# Patient Record
Sex: FEMALE | Race: WHITE | NOT HISPANIC OR LATINO | Employment: FULL TIME | URBAN - METROPOLITAN AREA
[De-identification: names, ages, dates, MRNs, and addresses within clinical notes are randomized per-mention and may not be internally consistent; named-entity substitution may affect disease eponyms.]

---

## 2017-08-22 ENCOUNTER — OFFICE VISIT (OUTPATIENT)
Dept: NEUROSURGERY | Facility: CLINIC | Age: 42
End: 2017-08-22

## 2017-08-22 VITALS — TEMPERATURE: 95.3 F | HEIGHT: 63 IN | WEIGHT: 163 LBS | BODY MASS INDEX: 28.88 KG/M2

## 2017-08-22 DIAGNOSIS — M50.30 BULGING OF CERVICAL INTERVERTEBRAL DISC: Primary | ICD-10-CM

## 2017-08-22 DIAGNOSIS — M47.812 CERVICAL ARTHRITIS: ICD-10-CM

## 2017-08-22 DIAGNOSIS — M50.30 DEGENERATIVE DISC DISEASE, CERVICAL: ICD-10-CM

## 2017-08-22 PROCEDURE — 99243 OFF/OP CNSLTJ NEW/EST LOW 30: CPT | Performed by: NEUROLOGICAL SURGERY

## 2017-08-22 RX ORDER — SERTRALINE HYDROCHLORIDE 25 MG/1
25 TABLET, FILM COATED ORAL DAILY
COMMUNITY
End: 2017-11-13 | Stop reason: DRUGHIGH

## 2017-08-22 NOTE — PROGRESS NOTES
Patient: Rebeca Fitzpatrick  : 1975    Primary Care Provider: URIEL Graves    Requesting Provider: As above        History    Chief Complaint: Neck pain.    History of Present Illness: Ms. Fitzpatrick is a 41 year old  with a two-year history of bothersome neck pain.  She denies any inciting or precipitating events.  She has seen two different chiropractors and has undergone physical therapy.  Traction has been somewhat helpful.  On occasion she gets pain extending into the proximal upper arms.  She does not really describe extensive radicular spread of her pain.  She has no numbness, tingling, or weakness.  Aleve has been helpful.  She reports no bowel or bladder difficulties.  She has no gait dysfunction.    Review of Systems   Constitutional: Positive for activity change and fatigue. Negative for appetite change, chills, diaphoresis, fever and unexpected weight change.   HENT: Positive for postnasal drip. Negative for congestion, dental problem, drooling, ear discharge, ear pain, facial swelling, hearing loss, mouth sores, nosebleeds, rhinorrhea, sinus pressure, sneezing, sore throat, tinnitus, trouble swallowing and voice change.    Eyes: Negative for photophobia, pain, discharge, redness, itching and visual disturbance.   Respiratory: Negative for apnea, cough, choking, chest tightness, shortness of breath, wheezing and stridor.    Cardiovascular: Negative for chest pain, palpitations and leg swelling.   Gastrointestinal: Negative for abdominal distention, abdominal pain, anal bleeding, blood in stool, constipation, diarrhea, nausea, rectal pain and vomiting.   Endocrine: Negative for cold intolerance, heat intolerance, polydipsia, polyphagia and polyuria.   Genitourinary: Negative for decreased urine volume, difficulty urinating, dysuria, enuresis, flank pain, frequency, genital sores, hematuria and urgency.   Musculoskeletal: Positive for back pain, myalgias, neck pain and neck stiffness.  "Negative for arthralgias, gait problem and joint swelling.   Skin: Negative for color change, pallor, rash and wound.   Allergic/Immunologic: Positive for environmental allergies. Negative for food allergies and immunocompromised state.   Neurological: Positive for light-headedness and headaches. Negative for dizziness, tremors, seizures, syncope, facial asymmetry, speech difficulty, weakness and numbness.   Hematological: Negative for adenopathy. Does not bruise/bleed easily.   Psychiatric/Behavioral: Positive for decreased concentration. Negative for agitation, behavioral problems, confusion, dysphoric mood, hallucinations, self-injury, sleep disturbance and suicidal ideas. The patient is not nervous/anxious and is not hyperactive.    All other systems reviewed and are negative.      The patient's past medical history, past surgical history, family history, and social history have been reviewed at length in the electronic medical record.    Physical Exam:   Temp 95.3 °F (35.2 °C) (Temporal Artery )   Ht 63\" (160 cm)  Wt 163 lb (73.9 kg)  BMI 28.87 kg/m2  CONSTITUTIONAL: Patient is well-nourished, pleasant and appears stated age.  CV: Heart regular rate and rhythm without murmur, rub, or gallop.  PULMONARY: Lungs are clear to ascultation.  MUSCULOSKELETAL:  Neck tenderness to palpation is not observed.   ROM in neck is limited in all directions particularly rotation leftward.  NEUROLOGICAL:  Orientation, memory, attention span, language function, and cognition have been examined and are intact.  Strength is intact in the upper and lower extremities to direct testing.  Muscle tone is normal throughout.  Station and gait are normal.  Sensation is intact to light touch testing throughout.  Deep tendon reflexes are 3+ and symmetrical.  Cindi's Sign is positive bilaterally. No clonus is elicited.  Coordination is intact.      Medical Decision Making    Data Review:   MRI of the cervical spine dated 8/14/17 " demonstrates some disc-osteophyte rightward at C5-6.  Cord compromise or signal change within the cord is not observed.    Diagnosis:   The patient despite having some disc protrusion and subtle long tract signs on examination does not harbor a focal radiculopathy or a myelopathy.  I believe that she is symptomatic from mechanical neck pain.    Treatment Options:   I do not currently see a role for surgical intervention.  I'm going to refer her to the pain clinic for some facet blocks and if necessary rhizotomies.  She will follow-up with me on an as-needed basis.       Diagnosis Plan   1. Bulging of cervical intervertebral disc  Ambulatory Referral to Pain Management   2. Cervical radiculopathy     3. Degenerative disc disease, cervical     4. Cervical arthritis         Scribed for Shorty Rodriguez MD by Angelina White CMA on 08/22/2017 at 1:49 PM    I, Dr. Rodrigeuz, personally performed the services described in the documentation, as scribed in my presence, and it is both accurate and complete.

## 2017-08-25 ENCOUNTER — TELEPHONE (OUTPATIENT)
Dept: PAIN MEDICINE | Facility: CLINIC | Age: 42
End: 2017-08-25

## 2017-09-07 ENCOUNTER — OFFICE VISIT (OUTPATIENT)
Dept: PAIN MEDICINE | Facility: CLINIC | Age: 42
End: 2017-09-07

## 2017-09-07 VITALS
TEMPERATURE: 97.6 F | OXYGEN SATURATION: 98 % | HEART RATE: 64 BPM | WEIGHT: 160 LBS | HEIGHT: 63 IN | RESPIRATION RATE: 14 BRPM | BODY MASS INDEX: 28.35 KG/M2 | SYSTOLIC BLOOD PRESSURE: 113 MMHG | DIASTOLIC BLOOD PRESSURE: 85 MMHG

## 2017-09-07 DIAGNOSIS — M50.920 CERVICAL DISC DISORDER OF MID-CERVICAL REGION: ICD-10-CM

## 2017-09-07 DIAGNOSIS — M79.18 MYOFASCIAL PAIN: ICD-10-CM

## 2017-09-07 DIAGNOSIS — F32.A ANXIETY AND DEPRESSION: ICD-10-CM

## 2017-09-07 DIAGNOSIS — R51.9 OCCIPITAL HEADACHE: ICD-10-CM

## 2017-09-07 DIAGNOSIS — M47.812 CERVICAL SPONDYLOSIS WITHOUT MYELOPATHY: ICD-10-CM

## 2017-09-07 DIAGNOSIS — F41.9 ANXIETY AND DEPRESSION: ICD-10-CM

## 2017-09-07 PROCEDURE — 99203 OFFICE O/P NEW LOW 30 MIN: CPT | Performed by: ANESTHESIOLOGY

## 2017-09-07 RX ORDER — ACETAMINOPHEN 500 MG
TABLET ORAL
Qty: 120 TABLET | Refills: 3 | Status: SHIPPED | OUTPATIENT
Start: 2017-09-07 | End: 2022-05-10

## 2017-09-07 RX ORDER — TIZANIDINE 2 MG/1
TABLET ORAL
Qty: 90 TABLET | Refills: 3 | Status: SHIPPED | OUTPATIENT
Start: 2017-09-07 | End: 2022-05-10

## 2017-09-07 RX ORDER — MELOXICAM 7.5 MG/1
TABLET ORAL
Qty: 60 TABLET | Refills: 3 | Status: SHIPPED | OUTPATIENT
Start: 2017-09-07 | End: 2022-05-10

## 2017-09-07 NOTE — PROGRESS NOTES
"Chief Complaint:  \"Pain in my neck.\"    History of Present Illness:   Patient: Ms. Rebeca Fitzpatrick, 41 y.o. female   Referring physician: Dr. Shorty Rodriguez   Reason for referral: Consultation for intractable chronic neck pain.   Pain history: Patient reports a 2 year history of pain, which began without incident. Pain has progressed in intensity over the past 1 year.   Pain description: Constant pain with intermittent exacerbation, described as aching, sharp and throbbing sensation.   Radiation of pain: The neck pain occasionally radiates into the upper back, shoulders, and occipital region  Pain intensity today: 8/10  Average pain intensity last week: 6-7/10  Pain intensity ranges from: 4/10 to 9/10  Aggravating factors: Pain increases with flexion, extension, and rotation of the cervical spine, and sitting at her desk for 5-10 minutes.   Alleviating factors: Pain decreases with physical therapy, specifically traction.   Associated symptoms:   Patient denies pain, numbness or weakness in the upper extremities.   Patient denies any new bladder or bowel problems.   Patient denies difficulties with her balance or recent falls.  Patient reports occipital headaches 2-3 x week lasting all day.    Review of previous therapies and additional medical records:  Rebeca Fitzpatrick has already failed the following measures, including:   Conservative measures: oral analgesics, physical therapy, ice, heat and chiropractic therapy   Interventional measures: None  Surgical measures: No previous cervical spine surgery  Rebeca Fitzpatrick underwent neurosurgical  consultation with Dr. Shorty Rodriguez on 08/22/2017, and was found not to be a surgical candidate.  Rebeca Fitzpatrick presents with significant comorbidities including anxiety and depression, engaged in treatment.   In terms of current analgesics, Rebeca Fitzpatrick takes: Johanny  I have reviewed [facility] Report #[id number] consistent to medication reconciliation.    Review of Diagnostic Studies:    MRI " Cervical Spine w/o Contrast on 08/14/2017: The visualized posterior fossa and skull base image normally.  There is mild.  Nasal or sinus disease partially visualized.  No discrete cord signal lesions are evident.  C2-C3: No significant canal or foraminal stenosis.  C3-C4: No significant canal or foraminal stenosis.  C4-C5: No significant canal or foraminal stenosis.    C5-C6: No significant canal or foraminal stenosis.  C6-C7: Small right paracentral disc osteophyte complex formation which effaces the right ventral cord surface. There is moderate to severe narrowing of the right neural foramen.  There is mild narrowing of the left neural foramen.  C7-T1: No significant canal or foraminal stenosis.  MRI Cervical Spine without Contrast, 09/04/2015. Vertebral body show no significant abnormalities. There are small anterior osteophytes in the lower cervical spine that are unlikely to be clinically significant. There is also small epidural defect in the thecal sac at the C6 6-C7 disc space level, which significantly indents the thecal sac, but does not compress the cord.  No intrinsic lesions are within the cord. The paravertebral tissues are unremarkable.  C3-C4: No significant abnormalities are seen.  C4-C5: No significant abnormalities are seen.  C5-C6: No significant abnormalities are seen.  C6-C7: There is a shallow right disc osteophyte complex which slightly indents the thecal sac, but does not appear to compress the cord or the exiting right C7 nerve root.  C7-T1: No significant abnormalities are seen.      Review of Systems   Constitutional: Positive for fatigue.   HENT: Positive for sinus pressure.    Eyes: Positive for itching.   Musculoskeletal: Positive for back pain, myalgias, neck pain and neck stiffness.   Allergic/Immunologic: Positive for environmental allergies.   Neurological: Positive for light-headedness and headaches.   Psychiatric/Behavioral: The patient is nervous/anxious (depression).    All  "other systems reviewed and are negative.     Patient Active Problem List   Diagnosis   • Cervical spondylosis without myelopathy   • Cervical disc disorder of mid-cervical region   • Myofascial pain   • Occipital headache   • Anxiety and depression     History reviewed. No pertinent past medical history.      Past Surgical History:   Procedure Laterality Date   • LAPAROSCOPY WITH LASER      [facility]         Family History   Problem Relation Age of Onset   • Brain cancer Father          Social History     Social History   • Marital status: Unknown     Spouse name: N/A   • Number of children: N/A   • Years of education: N/A     Social History Main Topics   • Smoking status: Never Smoker   • Smokeless tobacco: None   • Alcohol use Yes   • Drug use: No   • Sexual activity: Defer     Other Topics Concern   • None     Social History Narrative        Current Outpatient Prescriptions:   •  sertraline (ZOLOFT) 25 MG tablet, Take 25 mg by mouth Daily., Disp: , Rfl:       No Known Allergies      /85 (BP Location: Right arm, Patient Position: Sitting, Cuff Size: Large Adult)  Pulse 64  Temp 97.6 °F (36.4 °C) (Temporal Artery )   Resp 14  Ht 63\" (160 cm)  Wt 160 lb (72.6 kg)  SpO2 98%  BMI 28.34 kg/m2      Physical Exam:  Constitutional: Patient is oriented to person, place, and time. Vital signs are normal. Patient appears well-developed and well-nourished.   HENT: Head: Normocephalic and atraumatic. Eyes: Conjunctivae and lids are normal. Pupils: Equal, round, reactive to light.   Neck: Trachea normal. Neck supple. No JVD present.   Lymphatic: No cervical adenopathy  Pulmonary Respiratory effort: No increased work of breathing or signs of respiratory distress. Auscultation of lungs: Clear to auscultation.   Cardiovascular Auscultation of heart: Normal rate and rhythm, normal S1 and S2, no murmurs.   Musculoskeletal   Gait and station: Gait evaluation demonstrated a normal gait   Cervical spine: Passive " and active range of motion is limited secondary to pain. Extension, flexion, lateral flexion, rotation of the cervical spine increased and reproduced pain. Cervical facet joint loading maneuvers are positive.  Muscles: Presence of active trigger points at the bilateral levator scapulae and rhomboids   Shoulders: The range of motion of the glenohumeral joints is full and without pain. Rotator cuff strength is 5/5.   Thoracic spine: Thoracic facet joint loading maneuvers are negative   Neurological: Patient is alert and oriented to person, place, and time. Speech: speech is normal. Cortical function: Normal mental status.   Cranial nerves: Cranial nerves 2-12 intact.   Reflex Scores: 3+, symmetrical.  Motor strength: 5/5  Motor Tone: normal tone.   Involuntary movements: none.   Superficial/Primitive Reflexes: primitive reflexes were absent.   Right Krishna: absent  Left Krishna: absent  Right ankle clonus: absent  Left ankle clonus: absent   No nuchal rigidity. Negative Kernig and Brudzinski.  Spurling sign is negative. Lhermitte sign is negative. Negative long tract signs. Straight leg raising test is negative. Femoral stretch sign is negative.   Sensation: No sensory loss. Sensory exam: intact to light touch, intact pain and temperature sensation, intact vibration sensation and normal proprioception.   Coordination: Normal finger to nose and heel to shin. Normal balance and negative. Romberg's sign negative.   Skin and subcutaneous tissue: Skin is warm and intact. No rash noted. No cyanosis.   Psychiatric: Judgment and insight: Normal. Orientation to person, place and time: Normal. Recent and remote memory: Intact. Mood and affect: Normal.     ASSESSMENT:   1. Cervical spondylosis without myelopathy    2. Cervical disc disorder of mid-cervical region    3. Myofascial pain    4. Occipital headache    5. Anxiety and depression      PLAN/MEDICAL DECISION MAKING: I had a lengthy conversation with Ms. Rebeca Fitzpatrick  regarding her chronic pain condition and potential therapeutic options including risks, benefits, alternative therapies, to name a few. Patient has failed to obtain pain relief with conservative measures, as referenced above. I have reviewed all available patient's medical records as well as previous therapies as referenced above.  Therefore, I have proposed the following plan:  1. Pharmacological measures: Reviewed. Discussed. Patient takes Aleve as needed  A. Trial with tizanidine 2 mg 1/2 to 1 tablet three times a day as needed muscle spasms  B. Take Tylenol 500 mg four times a day as needed for mild to moderate breakthrough pain  C. Trial with meloxicam  7.5 mg two times a day as needed for moderate to severe breakthrough pain. Do not take other NSAIDs  D. Trial with prilocaine 2%, lidocaine 10%, imipramine 3%, capsaicin 0.001% and mannitol 20%  cream, apply 1 to 2 grams of cream to the affected areas every 4 to 6 hours as needed  2. Interventional pain management measures: Patient will be scheduled for diagnostic and therapeutic bilateral cervical facet joint injections at C5-C6 and C6-C7 combined with trigger point injections at the bilateral levator scapulae and bilateral rhomboids. If patient continues to struggle with pain, then, we will proceed with diagnostic bilateral cervical medial branch blocks at C5, C6, C7; for bilateral cervical facet joints at C5-C6, C6-C7 to clarify the origin of chronic refractory mechanical/axial cervicalgia. If patient experiences more than 80% pain relief along with significant improvement in the range of motion of the cervical spine, then, patient will be scheduled for a second set of diagnostic bilateral cervical medial branch blocks, to then, proceed with bilateral cervical medial branch rhizotomies. Otherwise, we will proceed with cervical epidural steroid injection by interlaminar approach. We may repeat another epidural depending on patient's outcome.    3. Long-term  rehabilitation efforts:  A. The patient does not have a history of falls. I did complete a risk assessment for falls.   B. Patient will start a comprehensive physical therapy program for upper body strengthening/posture correction, myofascial release, cupping and dry needling once pain is under control  C. Start an exercise program such as yoga, Pilates and daily walks for fitness  D. Contrast therapy: Apply ice-packs for 15-20 minutes, followed by heating pads for 15-20 minutes to affected area   E. Patient has declined a referral to Dr. Ash Christensen for cognitive behavioral therapy.  4. The patient and her family have been instructed to contact my office with any questions or difficulties. The patient understands the plan and agrees to proceed accordingly.       Patient Care Team:  URIEL Jenkins as PCP - General (Physician Assistant)  Shorty Rodriguez MD as Surgeon (Neurosurgery)  Jono Parham MD as Consulting Physician (Pain Medicine)     No orders of the defined types were placed in this encounter.        No future appointments.      Jono Parham MD     EMR Dragon/Transcription disclaimer:  Much of this encounter note is an electronic transcription of spoken language to printed text. Electronic transcription of spoken language may permit erroneous, or at times, nonsensical words or phrases to be inadvertently transcribed. Although I have reviewed the note for such errors, some may still exist.

## 2017-09-25 ENCOUNTER — OUTSIDE FACILITY SERVICE (OUTPATIENT)
Dept: PAIN MEDICINE | Facility: CLINIC | Age: 42
End: 2017-09-25

## 2017-09-25 PROCEDURE — 20553 NJX 1/MLT TRIGGER POINTS 3/>: CPT | Performed by: ANESTHESIOLOGY

## 2017-09-25 PROCEDURE — 99152 MOD SED SAME PHYS/QHP 5/>YRS: CPT | Performed by: ANESTHESIOLOGY

## 2017-09-25 PROCEDURE — 64491 INJ PARAVERT F JNT C/T 2 LEV: CPT | Performed by: ANESTHESIOLOGY

## 2017-09-25 PROCEDURE — 64490 INJ PARAVERT F JNT C/T 1 LEV: CPT | Performed by: ANESTHESIOLOGY

## 2017-11-10 ENCOUNTER — TELEPHONE (OUTPATIENT)
Dept: PAIN MEDICINE | Facility: CLINIC | Age: 42
End: 2017-11-10

## 2017-11-11 NOTE — PROGRESS NOTES
"Chief Complaint:  \"Pain in my neck.\"    History of Present Illness: Ms. Rebeca Fitzpatrick, 41 y.o. Female, originally referred by Dr. Shorty Rodriguez in consultation for intractable chronic neck pain and headaches.  Pain history: Patient reports a 2 year history of pain, which began without incident. Pain has progressed in intensity over the past 1 year. Patient called on 11/10/2017, requesting an appointment for evaluation of unrelenting occipital headaches. Patient was last seen on 09/25/2017, for diagnostic and therapeutic cervical facet joint injections and TPIs. She reports that she experienced complete pain relief for one day, with progressive recurrence of her pain. She reports that the pain that she is experiencing now is \"higher\" in her neck and associated with her chronic occipital headaches. Her previous pain radiating down the shoulders has improved since her procedure. Patient participated in physical therapy, walking for excercise, has used contrast therapy, Tylenol , meloxicam, and Zanaflex, without relief. She was unable to get to the pain cream due to insurance.   Pain description: Constant pain with intermittent exacerbation, described as aching, sharp and throbbing sensation.   Radiation of pain: The neck pain occasionally radiates into the upper back, shoulders, and occipital region  Pain intensity today: 7/10  Average pain intensity last week: 6-7/10  Pain intensity ranges from: 4/10 to 8/10  Aggravating factors: Pain increases with rotation of the cervical spine, and sitting at her desk for 5-10 minutes.   Alleviating factors: Pain decreases with physical therapy, specifically traction.   Associated symptoms:   Patient denies pain, numbness or weakness in the upper extremities.   Patient denies any new bladder or bowel problems.   Patient denies difficulties with her balance or recent falls.  Patient reports occipital headaches 2-3 x week lasting all day.She reports occipital headaches going up the " back of her head to the vertex.     Review of previous therapies and additional medical records:  Rebeca Fitzpatrick has already failed the following measures, including:   Conservative measures: oral analgesics, physical therapy, ice, heat and chiropractic therapy   Interventional measures: As referenced above  Surgical measures: No previous cervical spine surgery  Rebeca Fitzpatrick underwent neurosurgical consultation with Dr. Shorty Rodriguez on 08/22/2017, and was found not to be a surgical candidate.  Rebeca Fitzpatrick presents with significant comorbidity including anxiety and depression, engaged in treatment.   In terms of current analgesics, Rebeca Fitzpatrick takes: Tylenol, meloxicam, tizanidine  I have reviewed [facility] Report #[id number] consistent to medication reconciliation.    Diagnostic Studies:    MRI Cervical Spine w/o Contrast on 08/14/2017: The visualized posterior fossa and skull base image normally.  There is mild.  Nasal or sinus disease partially visualized.  No discrete cord signal lesions are evident.  C2-C3: No significant canal or foraminal stenosis.  C3-C4: No significant canal or foraminal stenosis.  C4-C5: No significant canal or foraminal stenosis.    C5-C6: No significant canal or foraminal stenosis.  C6-C7: Small right paracentral disc osteophyte complex formation which effaces the right ventral cord surface. There is moderate to severe narrowing of the right neural foramen.  There is mild narrowing of the left neural foramen.  C7-T1: No significant canal or foraminal stenosis.  MRI Cervical Spine without Contrast, 09/04/2015. Vertebral body show no significant abnormalities. There are small anterior osteophytes in the lower cervical spine that are unlikely to be clinically significant. There is also small epidural defect in the thecal sac at the C6 6-C7 disc space level, which significantly indents the thecal sac, but does not compress the cord.  No intrinsic lesions are within the cord. The paravertebral  tissues are unremarkable.  C3-C4: No significant abnormalities are seen.  C4-C5: No significant abnormalities are seen.  C5-C6: No significant abnormalities are seen.  C6-C7: There is a shallow right disc osteophyte complex which slightly indents the thecal sac, but does not appear to compress the cord or the exiting right C7 nerve root.  C7-T1: No significant abnormalities are seen.      Review of Systems   Constitutional: Positive for fatigue.   HENT: Positive for sinus pressure.    Eyes: Positive for itching.   Musculoskeletal: Positive for back pain, myalgias, neck pain and neck stiffness.   Allergic/Immunologic: Positive for environmental allergies.   Neurological: Positive for light-headedness and headaches.   Psychiatric/Behavioral: The patient is nervous/anxious (depression).    All other systems reviewed and are negative.     Patient Active Problem List   Diagnosis   • Cervical spondylosis without myelopathy   • Cervical disc disorder of mid-cervical region   • Myofascial pain   • Anxiety and depression   • Bilateral occipital neuralgia     Past Medical History:   Diagnosis Date   • Extremity pain    • Headache    • Neck pain          Past Surgical History:   Procedure Laterality Date   • LAPAROSCOPY WITH LASER      [facility]         Family History   Problem Relation Age of Onset   • Brain cancer Father          Social History     Social History   • Marital status: Unknown     Spouse name: N/A   • Number of children: N/A   • Years of education: N/A     Social History Main Topics   • Smoking status: Never Smoker   • Smokeless tobacco: Never Used   • Alcohol use Yes   • Drug use: No   • Sexual activity: Defer     Other Topics Concern   • None     Social History Narrative        Current Outpatient Prescriptions:   •  acetaminophen (TYLENOL) 500 MG tablet, Take one tablet by mouth four times a day as needed for mild to moderate breakthrough pain, Disp: 120 tablet, Rfl: 3  •  meloxicam (MOBIC) 7.5 MG  "tablet, Take 1 tablet two times a day as needed for moderate to severe breakthrough pain. Do not take any other NSAIDs., Disp: 60 tablet, Rfl: 3  •  sertraline (ZOLOFT) 100 MG tablet, , Disp: , Rfl: 0  •  tiZANidine (ZANAFLEX) 2 MG tablet, Take half to 1 tablet three times a day as needed for muscle spasms., Disp: 90 tablet, Rfl: 3      No Known Allergies      /80 (BP Location: Right arm, Patient Position: Sitting)  Pulse 97  Temp 97.8 °F (36.6 °C) (Temporal Artery )   Resp 18  Ht 63\" (160 cm)  Wt 164 lb (74.4 kg)  SpO2 97%  BMI 29.05 kg/m2      Physical Exam:  Constitutional: Patient is oriented to person, place, and time. Vital signs are normal. Patient appears well-developed and well-nourished.   HENT: Head: Normocephalic and atraumatic. Eyes: Conjunctivae and lids are normal. Pupils: Equal, round, reactive to light.   Neck: Trachea normal. Neck supple. No JVD present.   Lymphatic: No cervical adenopathy  Pulmonary Respiratory effort: No increased work of breathing or signs of respiratory distress. Auscultation of lungs: Clear to auscultation.   Cardiovascular Auscultation of heart: Normal rate and rhythm, normal S1 and S2, no murmurs.   Musculoskeletal   Gait and station: Gait evaluation demonstrated a normal gait   Cervical spine: Passive and active range of motion is significantly improved. Only rotation of the cervical spine increased and reproduced pain. Cervical facet joint loading maneuvers are positive on the left side only at C2-C3, C3-C4.  Muscles: Presence of active trigger points at the bilateral levator scapulae   Shoulders: The range of motion of the glenohumeral joints is full and without pain. Rotator cuff strength is 5/5.   Thoracic spine: Thoracic facet joint loading maneuvers are negative   Neurological: Patient is alert and oriented to person, place, and time. Speech: speech is normal. Cortical function: Normal mental status.   Cranial nerves: Cranial nerves 2-12 intact.   Reflex " Scores: 3+, symmetrical.  Motor strength: 5/5  Motor Tone: normal tone.   Involuntary movements: none.   Superficial/Primitive Reflexes: primitive reflexes were absent.   Right Krishna: absent  Left Krishna: absent  Right ankle clonus: absent  Left ankle clonus: absent   Spurling sign is negative. Lhermitte sign is negative. Negative long tract signs. Straight leg raising test is negative. Femoral stretch sign is negative.   Sensation: No sensory loss. Sensory exam: intact to light touch, intact pain and temperature sensation, intact vibration sensation and normal proprioception.   Coordination: Normal finger to nose and heel to shin. Normal balance and negative. Romberg's sign negative.   Skin and subcutaneous tissue: Skin is warm and intact. No rash noted. No cyanosis.   Psychiatric: Judgment and insight: Normal. Orientation to person, place and time: Normal. Recent and remote memory: Intact. Mood and affect: Normal.     ASSESSMENT:   1. Bilateral occipital neuralgia    2. Cervical spondylosis without myelopathy    3. Cervical disc disorder of mid-cervical region    4. Myofascial pain    5. Anxiety and depression       PLAN/MEDICAL DECISION MAKING:   I had a lengthy conversation with Ms. Rebeca Fitzpatrick regarding her chronic pain condition and potential therapeutic options including risks, benefits, alternative therapies, to name a few. Patient has failed to obtain pain relief with conservative measures, as referenced above. I have reviewed all available patient's medical records as well as previous therapies as referenced above.  Therefore, I have proposed the following plan:  1. Pharmacological measures: Reviewed. Discussed.   A.  Continue tizanidine 2 mg 1/2 to 1 tablet three times a day as needed muscle spasms  B. Take Tylenol 500 mg four times a day as needed for mild to moderate breakthrough pain  C. Continue meloxicam  7.5 mg two times a day as needed for moderate to severe breakthrough pain. Do not take other  NSAIDs  D. Trial with gabapentin 100 mg four times a day  E. Trial with lamotrigine 2.5%, lidocaine 2.22%, prilocaine 2.22%, meloxicam 0.09% cream, apply 1 to 2 grams of cream to the affected areas every 4 to 6 hours as needed  2. Interventional pain management measures: Patient will be scheduled for diagnostic and therapeutic bilateral greater occipital nerve blocks by hydrodissection technique under ultrasound and fluoroscopic guidance. If patient continues to struggle with pain, then, we may proceed with diagnostic bilateral cervical medial branch blocks at C3-C4, C5; for bilateral cervical facet joints at C3-C4, C4-C5 to clarify the origin of chronic refractory mechanical/axial cervicalgia. If patient experiences more than 80% pain relief along with significant improvement in the range of motion of the cervical spine, then, patient will be scheduled for a second set of diagnostic bilateral cervical medial branch blocks, to then, proceed with bilateral cervical medial branch rhizotomies.   3. Long-term rehabilitation efforts:  A. The patient does not have a history of falls. I did complete a risk assessment for falls.   B. Patient will resume a comprehensive physical therapy program for upper body strengthening/posture correction, myofascial release, cupping and dry needling once her pain is under control  C. Start an exercise program such as yoga, Pilates and daily walks for fitness  D. Contrast therapy: Apply ice-packs for 15-20 minutes, followed by heating pads for 15-20 minutes to affected area   E. Referral to Dr. Ash Christensen for cognitive behavioral therapy and biofeedback.  4. The patient and her family have been instructed to contact my office with any questions or difficulties. The patient understands the plan and agrees to proceed accordingly.       Patient Care Team:  URIEL Jenkins as PCP - General (Physician Assistant)  Shorty Rodriguez MD as Surgeon (Neurosurgery)  Jono Parham MD as  Consulting Physician (Pain Medicine)     New Medications Ordered This Visit   Medications   • sertraline (ZOLOFT) 100 MG tablet     Refill:  0         No future appointments.      Jono Parham MD     EMR Dragon/Transcription disclaimer:  Much of this encounter note is an electronic transcription of spoken language to printed text. Electronic transcription of spoken language may permit erroneous, or at times, nonsensical words or phrases to be inadvertently transcribed. Although I have reviewed the note for such errors, some may still exist.

## 2017-11-13 ENCOUNTER — OFFICE VISIT (OUTPATIENT)
Dept: PAIN MEDICINE | Facility: CLINIC | Age: 42
End: 2017-11-13

## 2017-11-13 VITALS
RESPIRATION RATE: 18 BRPM | DIASTOLIC BLOOD PRESSURE: 80 MMHG | SYSTOLIC BLOOD PRESSURE: 116 MMHG | HEART RATE: 97 BPM | HEIGHT: 63 IN | BODY MASS INDEX: 29.06 KG/M2 | WEIGHT: 164 LBS | OXYGEN SATURATION: 97 % | TEMPERATURE: 97.8 F

## 2017-11-13 DIAGNOSIS — M47.812 CERVICAL SPONDYLOSIS WITHOUT MYELOPATHY: ICD-10-CM

## 2017-11-13 DIAGNOSIS — M79.18 MYOFASCIAL PAIN: ICD-10-CM

## 2017-11-13 DIAGNOSIS — M54.81 BILATERAL OCCIPITAL NEURALGIA: ICD-10-CM

## 2017-11-13 DIAGNOSIS — M50.920 CERVICAL DISC DISORDER OF MID-CERVICAL REGION: ICD-10-CM

## 2017-11-13 DIAGNOSIS — F41.9 ANXIETY AND DEPRESSION: ICD-10-CM

## 2017-11-13 DIAGNOSIS — F32.A ANXIETY AND DEPRESSION: ICD-10-CM

## 2017-11-13 PROCEDURE — 99213 OFFICE O/P EST LOW 20 MIN: CPT | Performed by: ANESTHESIOLOGY

## 2017-11-13 RX ORDER — SERTRALINE HYDROCHLORIDE 100 MG/1
TABLET, FILM COATED ORAL
Refills: 0 | COMMUNITY
Start: 2017-10-19 | End: 2022-05-10 | Stop reason: ALTCHOICE

## 2017-11-13 RX ORDER — GABAPENTIN 100 MG/1
CAPSULE ORAL
Qty: 120 CAPSULE | Refills: 1 | Status: SHIPPED | OUTPATIENT
Start: 2017-11-13 | End: 2018-01-08 | Stop reason: SDUPTHER

## 2017-11-14 RX ORDER — EMOLLIENT BASE
CREAM (GRAM) TOPICAL
Qty: 60 G | Status: SHIPPED | OUTPATIENT
Start: 2017-11-14 | End: 2022-05-10

## 2017-11-29 ENCOUNTER — OUTSIDE FACILITY SERVICE (OUTPATIENT)
Dept: PAIN MEDICINE | Facility: CLINIC | Age: 42
End: 2017-11-29

## 2017-11-29 PROCEDURE — 64405 NJX AA&/STRD GR OCPL NRV: CPT | Performed by: ANESTHESIOLOGY

## 2017-11-29 PROCEDURE — 76942 ECHO GUIDE FOR BIOPSY: CPT | Performed by: ANESTHESIOLOGY

## 2017-11-29 PROCEDURE — 99152 MOD SED SAME PHYS/QHP 5/>YRS: CPT | Performed by: ANESTHESIOLOGY

## 2018-01-08 RX ORDER — GABAPENTIN 100 MG/1
CAPSULE ORAL
Qty: 120 CAPSULE | Refills: 5 | OUTPATIENT
Start: 2018-01-08 | End: 2022-05-10

## 2022-05-10 ENCOUNTER — OFFICE VISIT (OUTPATIENT)
Dept: FAMILY MEDICINE CLINIC | Facility: CLINIC | Age: 47
End: 2022-05-10

## 2022-05-10 VITALS
BODY MASS INDEX: 28.17 KG/M2 | WEIGHT: 159 LBS | HEIGHT: 63 IN | DIASTOLIC BLOOD PRESSURE: 84 MMHG | SYSTOLIC BLOOD PRESSURE: 134 MMHG | OXYGEN SATURATION: 97 % | HEART RATE: 74 BPM

## 2022-05-10 DIAGNOSIS — R53.83 FATIGUE, UNSPECIFIED TYPE: ICD-10-CM

## 2022-05-10 DIAGNOSIS — Z13.220 LIPID SCREENING: ICD-10-CM

## 2022-05-10 DIAGNOSIS — F41.9 ANXIETY: ICD-10-CM

## 2022-05-10 DIAGNOSIS — M79.7 FIBROMYALGIA: Primary | ICD-10-CM

## 2022-05-10 PROCEDURE — 36415 COLL VENOUS BLD VENIPUNCTURE: CPT | Performed by: PHYSICIAN ASSISTANT

## 2022-05-10 PROCEDURE — 99204 OFFICE O/P NEW MOD 45 MIN: CPT | Performed by: PHYSICIAN ASSISTANT

## 2022-05-10 RX ORDER — DULOXETIN HYDROCHLORIDE 60 MG/1
60 CAPSULE, DELAYED RELEASE ORAL DAILY
Qty: 90 CAPSULE | Refills: 1 | Status: SHIPPED | OUTPATIENT
Start: 2022-05-10 | End: 2022-11-10 | Stop reason: SDUPTHER

## 2022-05-10 RX ORDER — LORATADINE 10 MG/1
CAPSULE, LIQUID FILLED ORAL
COMMUNITY

## 2022-05-10 RX ORDER — DULOXETIN HYDROCHLORIDE 60 MG/1
60 CAPSULE, DELAYED RELEASE ORAL DAILY
COMMUNITY
End: 2022-05-10 | Stop reason: SDUPTHER

## 2022-05-10 NOTE — PROGRESS NOTES
"Chief Complaint  Anxiety    Subjective          Rebeca Fitzpatrick presents to [facility]  Patient in today for medication recheck and refill.  States the current dosage of the duloxetine continues to work well for her anxiety as well as for her fibromyalgia symptoms.  She would like to continue at this time.  Denies any side effects of medication.  She is also here fasting for labs.  Due for her mammogram and states she will schedule her own. States is utd on gyn exam at this time.     Anxiety  Presents for follow-up visit. Patient reports no chest pain, dizziness, nausea, palpitations or shortness of breath.           Objective   Vital Signs:   /84   Pulse 74   Ht 160 cm (63\")   Wt 72.1 kg (159 lb)   SpO2 97%   BMI 28.17 kg/m²     Body mass index is 28.17 kg/m².    Review of Systems   Constitutional: Positive for fatigue. Negative for fever.   HENT: Negative for congestion and sore throat.    Respiratory: Negative for cough and shortness of breath.    Cardiovascular: Negative for chest pain and palpitations.   Gastrointestinal: Negative for abdominal pain, diarrhea, nausea and vomiting.   Musculoskeletal: Positive for myalgias.   Neurological: Negative for dizziness and headache.   Psychiatric/Behavioral:        States anxiety is well controlled on current dosage of duloxetine       Past History:  Medical History: has a past medical history of Anxiety, Chronic major depressive disorder, single episode (10/22/2015), Extremity pain, Fibromyalgia, primary, Headache, Migraine headache, and Neck pain.   Surgical History: has a past surgical history that includes Laser laparoscopy (2002).   Family History: family history includes Alcohol abuse in her father; Alzheimer's disease in an other family member; Brain cancer in her father; Depression in her maternal grandmother; Hyperlipidemia in her mother; Hypertension in her mother; Migraines in her sister; Obesity in her sister.   Social " History: reports that she has never smoked. She has never used smokeless tobacco. She reports that she does not drink alcohol and does not use drugs.      Current Outpatient Medications:   •  DULoxetine (CYMBALTA) 60 MG capsule, Take 1 capsule by mouth Daily., Disp: 90 capsule, Rfl: 1  •  Loratadine (Claritin) 10 MG capsule, Take  by mouth., Disp: , Rfl:   Allergies: Patient has no known allergies.    Physical Exam  Constitutional:       Appearance: Normal appearance.   HENT:      Right Ear: Tympanic membrane normal.      Left Ear: Tympanic membrane normal.      Mouth/Throat:      Pharynx: Oropharynx is clear.   Eyes:      Conjunctiva/sclera: Conjunctivae normal.      Pupils: Pupils are equal, round, and reactive to light.   Cardiovascular:      Rate and Rhythm: Normal rate and regular rhythm.      Heart sounds: Normal heart sounds.   Pulmonary:      Effort: Pulmonary effort is normal.      Breath sounds: Normal breath sounds.   Abdominal:      Palpations: Abdomen is soft.      Tenderness: There is no abdominal tenderness.   Neurological:      Mental Status: She is oriented to person, place, and time.   Psychiatric:         Mood and Affect: Mood normal.         Behavior: Behavior normal.             Assessment and Plan   Diagnoses and all orders for this visit:    1. Fibromyalgia (Primary)  We will refill the duloxetine at current dosage.  Patient states continues to work well for her fibromyalgia as well as anxiety  symptoms.  Encouraged healthy diet and exercise.  Return to clinic prior to recheck as needed  2. Anxiety    3. Lipid screening  -     Lipid Panel; Future  Check fasting lipid today.  4. Fatigue, unspecified type  -     CBC & Differential; Future  -     Comprehensive Metabolic Panel; Future  -     TSH; Future  -     Vitamin B12; Future  Will check a few additional labs for intermittent fatigue.      Other orders  -     DULoxetine (CYMBALTA) 60 MG capsule; Take 1 capsule by mouth Daily.  Dispense: 90  capsule; Refill: 1            Follow Up   Return in about 6 months (around 11/10/2022).  Patient was given instructions and counseling regarding her condition or for health maintenance advice. Please see specific information pulled into the AVS if appropriate.     Diamante Julian PA-C

## 2022-05-11 LAB
ALBUMIN SERPL-MCNC: 4.5 G/DL (ref 3.8–4.8)
ALBUMIN/GLOB SERPL: 1.7 {RATIO} (ref 1.2–2.2)
ALP SERPL-CCNC: 85 IU/L (ref 44–121)
ALT SERPL-CCNC: 26 IU/L (ref 0–32)
AST SERPL-CCNC: 30 IU/L (ref 0–40)
BASOPHILS # BLD AUTO: 0.1 X10E3/UL (ref 0–0.2)
BASOPHILS NFR BLD AUTO: 1 %
BILIRUB SERPL-MCNC: 0.4 MG/DL (ref 0–1.2)
BUN SERPL-MCNC: 8 MG/DL (ref 6–24)
BUN/CREAT SERPL: 10 (ref 9–23)
CALCIUM SERPL-MCNC: 10 MG/DL (ref 8.7–10.2)
CHLORIDE SERPL-SCNC: 101 MMOL/L (ref 96–106)
CHOLEST SERPL-MCNC: 170 MG/DL (ref 100–199)
CO2 SERPL-SCNC: 22 MMOL/L (ref 20–29)
CREAT SERPL-MCNC: 0.81 MG/DL (ref 0.57–1)
EGFRCR SERPLBLD CKD-EPI 2021: 92 ML/MIN/1.73
EOSINOPHIL # BLD AUTO: 0.1 X10E3/UL (ref 0–0.4)
EOSINOPHIL NFR BLD AUTO: 2 %
ERYTHROCYTE [DISTWIDTH] IN BLOOD BY AUTOMATED COUNT: 12.2 % (ref 11.7–15.4)
GLOBULIN SER CALC-MCNC: 2.6 G/DL (ref 1.5–4.5)
GLUCOSE SERPL-MCNC: 96 MG/DL (ref 65–99)
HCT VFR BLD AUTO: 42.7 % (ref 34–46.6)
HDLC SERPL-MCNC: 45 MG/DL
HGB BLD-MCNC: 14 G/DL (ref 11.1–15.9)
IMM GRANULOCYTES # BLD AUTO: 0 X10E3/UL (ref 0–0.1)
IMM GRANULOCYTES NFR BLD AUTO: 0 %
LDLC SERPL CALC-MCNC: 112 MG/DL (ref 0–99)
LYMPHOCYTES # BLD AUTO: 2.9 X10E3/UL (ref 0.7–3.1)
LYMPHOCYTES NFR BLD AUTO: 37 %
MCH RBC QN AUTO: 29.7 PG (ref 26.6–33)
MCHC RBC AUTO-ENTMCNC: 32.8 G/DL (ref 31.5–35.7)
MCV RBC AUTO: 91 FL (ref 79–97)
MONOCYTES # BLD AUTO: 0.6 X10E3/UL (ref 0.1–0.9)
MONOCYTES NFR BLD AUTO: 8 %
NEUTROPHILS # BLD AUTO: 4.1 X10E3/UL (ref 1.4–7)
NEUTROPHILS NFR BLD AUTO: 52 %
PLATELET # BLD AUTO: 361 X10E3/UL (ref 150–450)
POTASSIUM SERPL-SCNC: 4.4 MMOL/L (ref 3.5–5.2)
PROT SERPL-MCNC: 7.1 G/DL (ref 6–8.5)
RBC # BLD AUTO: 4.72 X10E6/UL (ref 3.77–5.28)
SODIUM SERPL-SCNC: 139 MMOL/L (ref 134–144)
TRIGL SERPL-MCNC: 69 MG/DL (ref 0–149)
TSH SERPL DL<=0.005 MIU/L-ACNC: 1.07 UIU/ML (ref 0.45–4.5)
VIT B12 SERPL-MCNC: 353 PG/ML (ref 232–1245)
VLDLC SERPL CALC-MCNC: 13 MG/DL (ref 5–40)
WBC # BLD AUTO: 7.8 X10E3/UL (ref 3.4–10.8)

## 2022-05-16 DIAGNOSIS — E53.8 LOW SERUM VITAMIN B12: Primary | ICD-10-CM

## 2022-09-06 ENCOUNTER — LAB (OUTPATIENT)
Dept: FAMILY MEDICINE CLINIC | Facility: CLINIC | Age: 47
End: 2022-09-06

## 2022-09-06 DIAGNOSIS — E53.8 LOW SERUM VITAMIN B12: ICD-10-CM

## 2022-09-06 PROCEDURE — 36415 COLL VENOUS BLD VENIPUNCTURE: CPT | Performed by: PHYSICIAN ASSISTANT

## 2022-09-07 LAB
BASOPHILS # BLD AUTO: 0.1 X10E3/UL (ref 0–0.2)
BASOPHILS NFR BLD AUTO: 1 %
EOSINOPHIL # BLD AUTO: 0.2 X10E3/UL (ref 0–0.4)
EOSINOPHIL NFR BLD AUTO: 3 %
ERYTHROCYTE [DISTWIDTH] IN BLOOD BY AUTOMATED COUNT: 12.9 % (ref 11.7–15.4)
HCT VFR BLD AUTO: 42.3 % (ref 34–46.6)
HGB BLD-MCNC: 13.8 G/DL (ref 11.1–15.9)
IMM GRANULOCYTES # BLD AUTO: 0 X10E3/UL (ref 0–0.1)
IMM GRANULOCYTES NFR BLD AUTO: 0 %
LYMPHOCYTES # BLD AUTO: 3 X10E3/UL (ref 0.7–3.1)
LYMPHOCYTES NFR BLD AUTO: 39 %
MCH RBC QN AUTO: 29.4 PG (ref 26.6–33)
MCHC RBC AUTO-ENTMCNC: 32.6 G/DL (ref 31.5–35.7)
MCV RBC AUTO: 90 FL (ref 79–97)
MONOCYTES # BLD AUTO: 0.5 X10E3/UL (ref 0.1–0.9)
MONOCYTES NFR BLD AUTO: 7 %
NEUTROPHILS # BLD AUTO: 3.8 X10E3/UL (ref 1.4–7)
NEUTROPHILS NFR BLD AUTO: 50 %
PLATELET # BLD AUTO: 353 X10E3/UL (ref 150–450)
RBC # BLD AUTO: 4.69 X10E6/UL (ref 3.77–5.28)
VIT B12 SERPL-MCNC: 1477 PG/ML (ref 232–1245)
WBC # BLD AUTO: 7.6 X10E3/UL (ref 3.4–10.8)

## 2022-11-10 ENCOUNTER — OFFICE VISIT (OUTPATIENT)
Dept: FAMILY MEDICINE CLINIC | Facility: CLINIC | Age: 47
End: 2022-11-10

## 2022-11-10 VITALS
OXYGEN SATURATION: 95 % | BODY MASS INDEX: 28.53 KG/M2 | WEIGHT: 161 LBS | HEIGHT: 63 IN | HEART RATE: 99 BPM | SYSTOLIC BLOOD PRESSURE: 120 MMHG | DIASTOLIC BLOOD PRESSURE: 84 MMHG

## 2022-11-10 DIAGNOSIS — M79.7 FIBROMYALGIA: Primary | ICD-10-CM

## 2022-11-10 DIAGNOSIS — F41.9 ANXIETY: ICD-10-CM

## 2022-11-10 PROCEDURE — 99213 OFFICE O/P EST LOW 20 MIN: CPT | Performed by: PHYSICIAN ASSISTANT

## 2022-11-10 RX ORDER — DULOXETIN HYDROCHLORIDE 60 MG/1
60 CAPSULE, DELAYED RELEASE ORAL DAILY
Qty: 90 CAPSULE | Refills: 1 | Status: SHIPPED | OUTPATIENT
Start: 2022-11-10

## 2022-11-10 NOTE — PROGRESS NOTES
"Chief Complaint  Med Refill (6 month med check )    Subjective          Rebeca Fitzpatrick presents to [facility]  History of Present Illness  Patient in today for medication recheck and refills.  She states the duloxetine continues to work well for her fibromyalgia symptoms as well as her anxiety and she would like to continue at this time.  Denies any side effects of medication.  She is currently up-to-date on her fasting labs, GYN exam, and plans to schedule her mammogram.  Anxiety  Presents for follow-up visit. Symptoms include nervous/anxious behavior. Patient reports no chest pain, depressed mood, dizziness, irritability, nausea, palpitations or shortness of breath.           Objective   Vital Signs:   /84 (BP Location: Left arm, Patient Position: Sitting, Cuff Size: Adult)   Pulse 99   Ht 160 cm (63\")   Wt 73 kg (161 lb)   SpO2 95%   BMI 28.52 kg/m²     Body mass index is 28.52 kg/m².    Review of Systems   Constitutional: Negative for fatigue, fever and irritability.   HENT: Negative for congestion and sore throat.    Respiratory: Negative for cough and shortness of breath.    Cardiovascular: Negative for chest pain, palpitations and leg swelling.   Gastrointestinal: Negative for abdominal pain, diarrhea, nausea and vomiting.   Neurological: Negative for dizziness and headache.   Psychiatric/Behavioral: Negative for depressed mood. The patient is nervous/anxious.        Past History:  Medical History: has a past medical history of Anxiety, Chronic major depressive disorder, single episode (10/22/2015), Extremity pain, Fibromyalgia, primary, Headache, Migraine headache, and Neck pain.   Surgical History: has a past surgical history that includes Laser laparoscopy (2002).   Family History: family history includes Alcohol abuse in her father; Alzheimer's disease in an other family member; Brain cancer in her father; Depression in her maternal grandmother; Hyperlipidemia in " her mother; Hypertension in her mother; Migraines in her sister; Obesity in her sister.   Social History: reports that she has never smoked. She has never used smokeless tobacco. She reports that she does not drink alcohol and does not use drugs.      Current Outpatient Medications:   •  DULoxetine (CYMBALTA) 60 MG capsule, Take 1 capsule by mouth Daily., Disp: 90 capsule, Rfl: 1  •  Loratadine 10 MG capsule, Take  by mouth., Disp: , Rfl:   Allergies: Patient has no known allergies.    Physical Exam  Constitutional:       Appearance: Normal appearance.   HENT:      Right Ear: Tympanic membrane normal.      Left Ear: Tympanic membrane normal.      Mouth/Throat:      Pharynx: Oropharynx is clear.   Eyes:      Conjunctiva/sclera: Conjunctivae normal.      Pupils: Pupils are equal, round, and reactive to light.   Cardiovascular:      Rate and Rhythm: Normal rate and regular rhythm.      Heart sounds: Normal heart sounds.   Pulmonary:      Effort: Pulmonary effort is normal.      Breath sounds: Normal breath sounds.   Abdominal:      Palpations: Abdomen is soft.      Tenderness: There is no abdominal tenderness.   Neurological:      Mental Status: She is oriented to person, place, and time.   Psychiatric:         Mood and Affect: Mood normal.         Behavior: Behavior normal.             Assessment and Plan   Diagnoses and all orders for this visit:    1. Fibromyalgia (Primary)  Patient is pleased with the current dosage of duloxetine and will continue at this time.  Encouraged good hydration.  Encouraged healthy diet and exercise.  Return to clinic prior to recheck as needed.  2. Anxiety    Other orders  -     DULoxetine (CYMBALTA) 60 MG capsule; Take 1 capsule by mouth Daily.  Dispense: 90 capsule; Refill: 1            Follow Up   Return in about 6 months (around 5/10/2023).  Patient was given instructions and counseling regarding her condition or for health maintenance advice. Please see specific information pulled  into the AVS if appropriate.     Diamante Julian PA-C

## 2023-01-09 ENCOUNTER — OFFICE VISIT (OUTPATIENT)
Dept: FAMILY MEDICINE CLINIC | Facility: CLINIC | Age: 48
End: 2023-01-09
Payer: COMMERCIAL

## 2023-01-09 VITALS
HEIGHT: 63 IN | SYSTOLIC BLOOD PRESSURE: 126 MMHG | BODY MASS INDEX: 28.53 KG/M2 | DIASTOLIC BLOOD PRESSURE: 70 MMHG | HEART RATE: 98 BPM | OXYGEN SATURATION: 96 % | WEIGHT: 161 LBS

## 2023-01-09 DIAGNOSIS — G43.009 MIGRAINE WITHOUT AURA AND WITHOUT STATUS MIGRAINOSUS, NOT INTRACTABLE: Primary | ICD-10-CM

## 2023-01-09 DIAGNOSIS — R31.9 HEMATURIA, UNSPECIFIED TYPE: ICD-10-CM

## 2023-01-09 LAB
BILIRUB BLD-MCNC: ABNORMAL MG/DL
CLARITY, POC: CLEAR
COLOR UR: ABNORMAL
EXPIRATION DATE: ABNORMAL
GLUCOSE UR STRIP-MCNC: NEGATIVE MG/DL
KETONES UR QL: NEGATIVE
LEUKOCYTE EST, POC: NEGATIVE
Lab: ABNORMAL
NITRITE UR-MCNC: NEGATIVE MG/ML
PH UR: 5 [PH] (ref 5–8)
PROT UR STRIP-MCNC: NEGATIVE MG/DL
RBC # UR STRIP: NEGATIVE /UL
SP GR UR: 1.03 (ref 1–1.03)
UROBILINOGEN UR QL: NORMAL

## 2023-01-09 PROCEDURE — 81003 URINALYSIS AUTO W/O SCOPE: CPT | Performed by: PHYSICIAN ASSISTANT

## 2023-01-09 PROCEDURE — 99214 OFFICE O/P EST MOD 30 MIN: CPT | Performed by: PHYSICIAN ASSISTANT

## 2023-01-09 RX ORDER — RIMEGEPANT SULFATE 75 MG/75MG
75 TABLET, ORALLY DISINTEGRATING ORAL ONCE AS NEEDED
Qty: 9 TABLET | Refills: 1 | Status: SHIPPED | OUTPATIENT
Start: 2023-01-09 | End: 2023-01-23

## 2023-01-09 RX ORDER — RIMEGEPANT SULFATE 75 MG/75MG
75 TABLET, ORALLY DISINTEGRATING ORAL ONCE AS NEEDED
Qty: 8 TABLET | Refills: 3 | Status: SHIPPED | OUTPATIENT
Start: 2023-01-09 | End: 2023-01-23

## 2023-01-09 RX ORDER — IBUPROFEN 600 MG/1
600 TABLET ORAL EVERY 6 HOURS PRN
Qty: 30 TABLET | Refills: 2 | Status: SHIPPED | OUTPATIENT
Start: 2023-01-09

## 2023-01-09 RX ORDER — ONDANSETRON 4 MG/1
4 TABLET, FILM COATED ORAL EVERY 8 HOURS PRN
Qty: 20 TABLET | Refills: 1 | Status: SHIPPED | OUTPATIENT
Start: 2023-01-09

## 2023-01-09 NOTE — PROGRESS NOTES
Chief Complaint  Migraine and Urinary Tract Infection    Subjective        Rebeca Fitzpatrick presents to [facility]  History of Present Illness  Patient is concerned that she may have a UTI.  She states that she had seen some blood with urinating.  She states that she also may have started her period early.  She states that she is supposed to have her period next week.  She states that she has been wearing a pad for the past few days.  She denies any fever or dysuria or back pain.    Patient states that she has always had really bad headaches.  She states that over the years they have become more frequent and more bothersome.  She states that last week she had to take 2 days off from work and lay in bed all day.  She states that migraines run in the family.  She states that she has both sister and cousins with migraines.  She states that the pain usually starts of her right and then branches out to include her entire head.  She describes the pain as throbbing.  She states she has light and sound sensitivity.  She states he has to wait for them to go away.  She finds that laying down in a dark room usually helps.  She occasionally has nausea when she has a very bad headache.  She has tried Tylenol and Advil with no help in the past.  She states that she can tell when they are coming on.  She denies any spots or rings around lights before the pain appears.  She states she usually has 2 out of 7 days with headaches.  She has never been on any migraine medicines in the past.      Objective   Vital Signs:  /70   Pulse 98   Ht 160 cm (63\")   Wt 73 kg (161 lb)   SpO2 96%   BMI 28.52 kg/m²   Estimated body mass index is 28.52 kg/m² as calculated from the following:    Height as of this encounter: 160 cm (63\").    Weight as of this encounter: 73 kg (161 lb).        Send another [facility] to [facility] pharm  Physical Exam  Vitals and nursing note reviewed.   Constitutional:       Appearance:  Normal appearance.   HENT:      Head: Normocephalic and atraumatic.      Right Ear: Tympanic membrane, ear canal and external ear normal.      Left Ear: Tympanic membrane, ear canal and external ear normal.      Nose: Nose normal.      Mouth/Throat:      Mouth: Mucous membranes are moist.   Eyes:      Pupils: Pupils are equal, round, and reactive to light.   Cardiovascular:      Rate and Rhythm: Normal rate and regular rhythm.      Heart sounds: Normal heart sounds.   Pulmonary:      Effort: Pulmonary effort is normal.      Breath sounds: Normal breath sounds.   Neurological:      General: No focal deficit present.      Mental Status: She is alert.   Psychiatric:         Mood and Affect: Mood normal.        Result Review :     POC Urinalysis Dipstick, Automated (01/09/2023 14:58)              Assessment and Plan   Diagnoses and all orders for this visit:    1. Migraine without aura and without status migrainosus, not intractable (Primary)  -     ondansetron (Zofran) 4 MG tablet; Take 1 tablet by mouth Every 8 (Eight) Hours As Needed for Nausea or Vomiting.  Dispense: 20 tablet; Refill: 1  -     ibuprofen (ADVIL,MOTRIN) 600 MG tablet; Take 1 tablet by mouth Every 6 (Six) Hours As Needed for Mild Pain.  Dispense: 30 tablet; Refill: 2  -     Rimegepant Sulfate (Nurtec) 75 MG tablet dispersible tablet; Take 1 tablet by mouth 1 (One) Time As Needed (migraine) for up to 9 doses.  Dispense: 9 tablet; Refill: 1  Discussed the pathophysiology of migraines.  We will place her on Nurtec.  Patient was instructed to take medication at the first onset of symptoms.  We will also have her take Advil at the same time.  She may also add Zofran if she has nausea.  We will send patient with a coupon I will also send prescription for the Nurtec to specialty pharmacy.  2. Hematuria, unspecified type  -     POC Urinalysis Dipstick, Automated  Discussed with patient that her urinalysis was negative.  Blood most likely menstrual  blood.           Follow Up   Return in about 2 weeks (around 1/23/2023) for Recheck migranes.  Patient was given instructions and counseling regarding her condition or for health maintenance advice. Please see specific information pulled into the AVS if appropriate.

## 2023-01-23 ENCOUNTER — OFFICE VISIT (OUTPATIENT)
Dept: FAMILY MEDICINE CLINIC | Facility: CLINIC | Age: 48
End: 2023-01-23
Payer: COMMERCIAL

## 2023-01-23 VITALS
HEART RATE: 101 BPM | OXYGEN SATURATION: 96 % | HEIGHT: 63 IN | BODY MASS INDEX: 28.71 KG/M2 | RESPIRATION RATE: 17 BRPM | DIASTOLIC BLOOD PRESSURE: 84 MMHG | WEIGHT: 162.06 LBS | SYSTOLIC BLOOD PRESSURE: 142 MMHG

## 2023-01-23 DIAGNOSIS — G43.009 MIGRAINE WITHOUT AURA AND WITHOUT STATUS MIGRAINOSUS, NOT INTRACTABLE: Primary | ICD-10-CM

## 2023-01-23 PROCEDURE — 99213 OFFICE O/P EST LOW 20 MIN: CPT | Performed by: PHYSICIAN ASSISTANT

## 2023-01-23 RX ORDER — RIMEGEPANT SULFATE 75 MG/75MG
75 TABLET, ORALLY DISINTEGRATING ORAL ONCE AS NEEDED
Qty: 9 TABLET | Refills: 3 | Status: SHIPPED | OUTPATIENT
Start: 2023-01-23 | End: 2023-03-28

## 2023-01-23 NOTE — PROGRESS NOTES
"Chief Complaint  Follow-up (Following up on Nurtec. She has had one headache since her visit, the Nurtec did help the headache. )    Subjective        Rebeca Fitzpatrick presents to [facility]  History of Present Illness  Patient states that she is only had 1 migraine since seeing us last.  She states that she took her Nurtec and migraine resolved.  She states that she took the medication and felt sick for couple of hours when she woke up her headache was completely gone.  She has not needed her Zofran or ibuprofen either.      Objective   Vital Signs:  /84 (BP Location: Left arm, Patient Position: Sitting, Cuff Size: Adult)   Pulse 101   Resp 17   Ht 160 cm (63\")   Wt 73.5 kg (162 lb 1 oz)   SpO2 96%   BMI 28.71 kg/m²   Estimated body mass index is 28.71 kg/m² as calculated from the following:    Height as of this encounter: 160 cm (63\").    Weight as of this encounter: 73.5 kg (162 lb 1 oz).             Physical Exam  Vitals and nursing note reviewed.   Constitutional:       Appearance: Normal appearance.   HENT:      Head: Normocephalic and atraumatic.      Right Ear: Tympanic membrane, ear canal and external ear normal.      Left Ear: Tympanic membrane, ear canal and external ear normal.      Nose: Nose normal.      Mouth/Throat:      Mouth: Mucous membranes are moist.   Eyes:      Pupils: Pupils are equal, round, and reactive to light.   Cardiovascular:      Rate and Rhythm: Normal rate and regular rhythm.      Heart sounds: Normal heart sounds.   Pulmonary:      Effort: Pulmonary effort is normal.      Breath sounds: Normal breath sounds.   Neurological:      General: No focal deficit present.      Mental Status: She is alert.   Psychiatric:         Mood and Affect: Mood normal.        Result Review :                   Assessment and Plan   Diagnoses and all orders for this visit:    1. Migraine without aura and without status migrainosus, not intractable (Primary)  -     " Rimegepant Sulfate (Nurtec) 75 MG tablet dispersible tablet; Take 1 tablet by mouth 1 (One) Time As Needed (migraine) for up to 9 doses.  Dispense: 9 tablet; Refill: 3    Improved we will send Nurtec to specialty pharmacy for approval.  Patient has 1 round of medicine for free.  We have also given her couple of samples in case she runs out in the meantime.  We will await her prior Auth for the medication.         Follow Up   No follow-ups on file.  Patient was given instructions and counseling regarding her condition or for health maintenance advice. Please see specific information pulled into the AVS if appropriate.

## 2023-03-28 DIAGNOSIS — G43.009 MIGRAINE WITHOUT AURA AND WITHOUT STATUS MIGRAINOSUS, NOT INTRACTABLE: ICD-10-CM

## 2023-03-28 RX ORDER — RIMEGEPANT SULFATE 75 MG/75MG
TABLET, ORALLY DISINTEGRATING ORAL
Qty: 9 TABLET | Refills: 0 | Status: SHIPPED | OUTPATIENT
Start: 2023-03-28

## 2023-05-11 ENCOUNTER — OFFICE VISIT (OUTPATIENT)
Dept: FAMILY MEDICINE CLINIC | Facility: CLINIC | Age: 48
End: 2023-05-11
Payer: COMMERCIAL

## 2023-05-11 VITALS
TEMPERATURE: 98.1 F | OXYGEN SATURATION: 96 % | DIASTOLIC BLOOD PRESSURE: 82 MMHG | WEIGHT: 164.19 LBS | SYSTOLIC BLOOD PRESSURE: 130 MMHG | BODY MASS INDEX: 29.09 KG/M2 | HEIGHT: 63 IN | HEART RATE: 86 BPM

## 2023-05-11 DIAGNOSIS — E53.8 B12 DEFICIENCY: ICD-10-CM

## 2023-05-11 DIAGNOSIS — Z12.11 COLON CANCER SCREENING: ICD-10-CM

## 2023-05-11 DIAGNOSIS — F41.9 ANXIETY: ICD-10-CM

## 2023-05-11 DIAGNOSIS — Z13.220 LIPID SCREENING: ICD-10-CM

## 2023-05-11 DIAGNOSIS — R53.83 FATIGUE, UNSPECIFIED TYPE: ICD-10-CM

## 2023-05-11 DIAGNOSIS — M79.7 FIBROMYALGIA: Primary | ICD-10-CM

## 2023-05-11 PROCEDURE — 99214 OFFICE O/P EST MOD 30 MIN: CPT | Performed by: PHYSICIAN ASSISTANT

## 2023-05-11 RX ORDER — DULOXETIN HYDROCHLORIDE 60 MG/1
60 CAPSULE, DELAYED RELEASE ORAL DAILY
Qty: 90 CAPSULE | Refills: 1 | Status: SHIPPED | OUTPATIENT
Start: 2023-05-11

## 2023-05-11 NOTE — PROGRESS NOTES
"Chief Complaint  Anxiety    Subjective          Rebeca Fitzpatrick presents to [facility]  History of Present Illness  Patient in today for medication recheck and refills . States the cymbalta continues to work well for her anxiety and fibromyalgia and would like to continue on current dosage. Denies any side effects of medication. She would like to rtc for fasting labs. Is due for colon cancer screening and would like to get colonoscopy scheduled. Denies any changes to bowel habits. She states is due for mammogram and states will schedule own and states is her pap smear is up to date.   Anxiety  Presents for follow-up visit. Symptoms include nervous/anxious behavior. Patient reports no chest pain, depressed mood, dizziness, nausea or shortness of breath.           Objective   Vital Signs:   /82   Pulse 86   Temp 98.1 °F (36.7 °C)   Ht 160 cm (63\")   Wt 74.5 kg (164 lb 3 oz)   SpO2 96%   BMI 29.08 kg/m²     Body mass index is 29.08 kg/m².    Review of Systems   Constitutional: Negative for fever.   HENT: Negative for congestion and sore throat.    Respiratory: Negative for cough and shortness of breath.    Cardiovascular: Negative for chest pain.   Gastrointestinal: Negative for abdominal pain, diarrhea, nausea and vomiting.   Neurological: Negative for dizziness and headache.   Psychiatric/Behavioral: Negative for depressed mood. The patient is nervous/anxious.        Past History:  Medical History: has a past medical history of Anxiety, Chronic major depressive disorder, single episode (10/22/2015), Extremity pain, Fibromyalgia, primary, Headache, Migraine headache, and Neck pain.   Surgical History: has a past surgical history that includes Laser laparoscopy (2002).   Family History: family history includes Alcohol abuse in her father; Alzheimer's disease in an other family member; Brain cancer in her father; Depression in her maternal grandmother; Hyperlipidemia in her " mother; Hypertension in her mother; Migraines in her sister; Obesity in her sister.   Social History: reports that she has never smoked. She has never used smokeless tobacco. She reports that she does not drink alcohol and does not use drugs.      Current Outpatient Medications:   •  DULoxetine (CYMBALTA) 60 MG capsule, Take 1 capsule by mouth Daily., Disp: 90 capsule, Rfl: 1  •  ibuprofen (ADVIL,MOTRIN) 600 MG tablet, Take 1 tablet by mouth Every 6 (Six) Hours As Needed for Mild Pain., Disp: 30 tablet, Rfl: 2  •  Loratadine 10 MG capsule, Take  by mouth., Disp: , Rfl:   •  Nurtec 75 MG dispersible tablet, TAKE 1 TABLET BY MOUTH AS NEEDED FOR MIGRAINE, Disp: 9 tablet, Rfl: 0  •  ondansetron (Zofran) 4 MG tablet, Take 1 tablet by mouth Every 8 (Eight) Hours As Needed for Nausea or Vomiting., Disp: 20 tablet, Rfl: 1  Allergies: Patient has no known allergies.    Physical Exam  Constitutional:       Appearance: Normal appearance.   HENT:      Right Ear: Tympanic membrane normal.      Left Ear: Tympanic membrane normal.      Mouth/Throat:      Pharynx: Oropharynx is clear.   Eyes:      Conjunctiva/sclera: Conjunctivae normal.      Pupils: Pupils are equal, round, and reactive to light.   Cardiovascular:      Rate and Rhythm: Normal rate and regular rhythm.      Heart sounds: Normal heart sounds.   Pulmonary:      Effort: Pulmonary effort is normal.      Breath sounds: Normal breath sounds.   Abdominal:      Palpations: Abdomen is soft.      Tenderness: There is no abdominal tenderness.   Neurological:      Mental Status: She is oriented to person, place, and time.   Psychiatric:         Mood and Affect: Mood normal.         Behavior: Behavior normal.             Assessment and Plan   Diagnoses and all orders for this visit:    1. Fibromyalgia (Primary)  Refilled cymbalta at current dosage. RTC prior to recheck as needed.   2. Anxiety    3. Colon cancer screening  -     Ambulatory Referral to Gastroenterology  Will set  up with GI.   4. Lipid screening  May rtc for fasting labs.   5. B12 deficiency  Will recheck b12 with labs- currently taking 1000mcg daily.   6. Fatigue, unspecified type  Will check labs today- if not improving, rtc   Other orders  -     DULoxetine (CYMBALTA) 60 MG capsule; Take 1 capsule by mouth Daily.  Dispense: 90 capsule; Refill: 1            Follow Up   No follow-ups on file.  Patient was given instructions and counseling regarding her condition or for health maintenance advice. Please see specific information pulled into the AVS if appropriate.     Diamante Julian PA-C

## 2023-05-16 ENCOUNTER — LAB (OUTPATIENT)
Dept: FAMILY MEDICINE CLINIC | Facility: CLINIC | Age: 48
End: 2023-05-16
Payer: COMMERCIAL

## 2023-05-16 DIAGNOSIS — Z13.220 LIPID SCREENING: ICD-10-CM

## 2023-05-16 DIAGNOSIS — E53.8 B12 DEFICIENCY: Primary | ICD-10-CM

## 2023-05-16 DIAGNOSIS — E53.8 B12 DEFICIENCY: ICD-10-CM

## 2023-05-16 DIAGNOSIS — R53.83 FATIGUE, UNSPECIFIED TYPE: ICD-10-CM

## 2023-05-16 PROCEDURE — 36415 COLL VENOUS BLD VENIPUNCTURE: CPT | Performed by: PHYSICIAN ASSISTANT

## 2023-05-17 LAB
ALBUMIN SERPL-MCNC: 4.5 G/DL (ref 3.8–4.8)
ALBUMIN/GLOB SERPL: 1.8 {RATIO} (ref 1.2–2.2)
ALP SERPL-CCNC: 65 IU/L (ref 44–121)
ALT SERPL-CCNC: 24 IU/L (ref 0–32)
AST SERPL-CCNC: 27 IU/L (ref 0–40)
BASOPHILS # BLD AUTO: 0.1 X10E3/UL (ref 0–0.2)
BASOPHILS NFR BLD AUTO: 1 %
BILIRUB SERPL-MCNC: 0.5 MG/DL (ref 0–1.2)
BUN SERPL-MCNC: 9 MG/DL (ref 6–24)
BUN/CREAT SERPL: 13 (ref 9–23)
CALCIUM SERPL-MCNC: 9 MG/DL (ref 8.7–10.2)
CHLORIDE SERPL-SCNC: 103 MMOL/L (ref 96–106)
CHOLEST SERPL-MCNC: 165 MG/DL (ref 100–199)
CO2 SERPL-SCNC: 19 MMOL/L (ref 20–29)
CREAT SERPL-MCNC: 0.67 MG/DL (ref 0.57–1)
EGFRCR SERPLBLD CKD-EPI 2021: 110 ML/MIN/1.73
EOSINOPHIL # BLD AUTO: 0.2 X10E3/UL (ref 0–0.4)
EOSINOPHIL NFR BLD AUTO: 3 %
ERYTHROCYTE [DISTWIDTH] IN BLOOD BY AUTOMATED COUNT: 12.3 % (ref 11.7–15.4)
GLOBULIN SER CALC-MCNC: 2.5 G/DL (ref 1.5–4.5)
GLUCOSE SERPL-MCNC: 84 MG/DL (ref 70–99)
HCT VFR BLD AUTO: 42.1 % (ref 34–46.6)
HDLC SERPL-MCNC: 46 MG/DL
HGB BLD-MCNC: 13.8 G/DL (ref 11.1–15.9)
IMM GRANULOCYTES # BLD AUTO: 0 X10E3/UL (ref 0–0.1)
IMM GRANULOCYTES NFR BLD AUTO: 0 %
LDLC SERPL CALC-MCNC: 109 MG/DL (ref 0–99)
LYMPHOCYTES # BLD AUTO: 2.4 X10E3/UL (ref 0.7–3.1)
LYMPHOCYTES NFR BLD AUTO: 33 %
MCH RBC QN AUTO: 30.1 PG (ref 26.6–33)
MCHC RBC AUTO-ENTMCNC: 32.8 G/DL (ref 31.5–35.7)
MCV RBC AUTO: 92 FL (ref 79–97)
MONOCYTES # BLD AUTO: 0.5 X10E3/UL (ref 0.1–0.9)
MONOCYTES NFR BLD AUTO: 7 %
NEUTROPHILS # BLD AUTO: 4.1 X10E3/UL (ref 1.4–7)
NEUTROPHILS NFR BLD AUTO: 56 %
PLATELET # BLD AUTO: 326 X10E3/UL (ref 150–450)
POTASSIUM SERPL-SCNC: 4.3 MMOL/L (ref 3.5–5.2)
PROT SERPL-MCNC: 7 G/DL (ref 6–8.5)
RBC # BLD AUTO: 4.58 X10E6/UL (ref 3.77–5.28)
SODIUM SERPL-SCNC: 138 MMOL/L (ref 134–144)
TRIGL SERPL-MCNC: 50 MG/DL (ref 0–149)
TSH SERPL DL<=0.005 MIU/L-ACNC: 1.04 UIU/ML (ref 0.45–4.5)
VIT B12 SERPL-MCNC: 429 PG/ML (ref 232–1245)
VLDLC SERPL CALC-MCNC: 10 MG/DL (ref 5–40)
WBC # BLD AUTO: 7.2 X10E3/UL (ref 3.4–10.8)

## 2023-11-13 ENCOUNTER — OFFICE VISIT (OUTPATIENT)
Dept: FAMILY MEDICINE CLINIC | Facility: CLINIC | Age: 48
End: 2023-11-13
Payer: COMMERCIAL

## 2023-11-13 VITALS
DIASTOLIC BLOOD PRESSURE: 90 MMHG | HEART RATE: 101 BPM | HEIGHT: 63 IN | SYSTOLIC BLOOD PRESSURE: 120 MMHG | BODY MASS INDEX: 27.46 KG/M2 | WEIGHT: 155 LBS | OXYGEN SATURATION: 99 %

## 2023-11-13 DIAGNOSIS — F41.9 ANXIETY: ICD-10-CM

## 2023-11-13 DIAGNOSIS — J02.9 SORE THROAT: Primary | ICD-10-CM

## 2023-11-13 DIAGNOSIS — M79.7 FIBROMYALGIA: ICD-10-CM

## 2023-11-13 PROCEDURE — 99214 OFFICE O/P EST MOD 30 MIN: CPT | Performed by: PHYSICIAN ASSISTANT

## 2023-11-13 RX ORDER — DULOXETIN HYDROCHLORIDE 60 MG/1
60 CAPSULE, DELAYED RELEASE ORAL DAILY
Qty: 90 CAPSULE | Refills: 1 | Status: SHIPPED | OUTPATIENT
Start: 2023-11-13

## 2023-11-13 NOTE — PROGRESS NOTES
"Chief Complaint  Sore Throat (Sore throat started 2 days ago) and Fibromyalgia    Subjective          Rebeca Fitzpatrick presents to [facility]  History of Present Illness  Patient in today for medication recheck and refills. States the duloxetine continues to work well for her anxiety and fibromyalgia symptoms. Denies side effects of medication. She had fasting labs on last visit. She is due for mammogram and states will call to get her appt scheduled. She states started with sore throat yesterday but states had reflux symptoms the night before and feels is related. Denies any fever, nasal congestion, nausea or vomiting. Denies any known sick contacts.     Sore Throat   This is a new problem. The current episode started yesterday. There has been no fever. Pertinent negatives include no abdominal pain, congestion, coughing, diarrhea, headaches, shortness of breath or vomiting.   Anxiety  Presents for follow-up visit. Symptoms include nervous/anxious behavior. Patient reports no chest pain, depressed mood, dizziness, irritability, nausea, palpitations or shortness of breath.           Objective   Vital Signs:   /90   Pulse 101   Ht 160 cm (63\")   Wt 70.3 kg (155 lb)   SpO2 99%   BMI 27.46 kg/m²     Body mass index is 27.46 kg/m².    Review of Systems   Constitutional:  Negative for fever and irritability.   HENT:  Positive for sore throat. Negative for congestion.    Respiratory:  Negative for cough and shortness of breath.    Cardiovascular:  Negative for chest pain and palpitations.   Gastrointestinal:  Negative for abdominal pain, diarrhea, nausea and vomiting.   Neurological:  Negative for dizziness and headache.   Psychiatric/Behavioral:  Negative for depressed mood. The patient is nervous/anxious.        Past History:  Medical History: has a past medical history of Anxiety, Chronic major depressive disorder, single episode (10/22/2015), Extremity pain, Fibromyalgia, primary, " Headache, Migraine headache, and Neck pain.   Surgical History: has a past surgical history that includes Laser laparoscopy (2002).   Family History: family history includes Alcohol abuse in her father; Alzheimer's disease in an other family member; Brain cancer in her father; Depression in her maternal grandmother; Hyperlipidemia in her mother; Hypertension in her mother; Migraines in her sister; Obesity in her sister.   Social History: reports that she has never smoked. She has never used smokeless tobacco. She reports that she does not drink alcohol and does not use drugs.      Current Outpatient Medications:     DULoxetine (CYMBALTA) 60 MG capsule, Take 1 capsule by mouth Daily., Disp: 90 capsule, Rfl: 1    ibuprofen (ADVIL,MOTRIN) 600 MG tablet, Take 1 tablet by mouth Every 6 (Six) Hours As Needed for Mild Pain., Disp: 30 tablet, Rfl: 2    Loratadine 10 MG capsule, Take  by mouth., Disp: , Rfl:     Nurtec 75 MG dispersible tablet, TAKE 1 TABLET BY MOUTH AS NEEDED FOR MIGRAINE, Disp: 9 tablet, Rfl: 0    ondansetron (Zofran) 4 MG tablet, Take 1 tablet by mouth Every 8 (Eight) Hours As Needed for Nausea or Vomiting., Disp: 20 tablet, Rfl: 1  Allergies: Patient has no known allergies.    Physical Exam  Constitutional:       Appearance: Normal appearance.   HENT:      Right Ear: Tympanic membrane normal.      Left Ear: Tympanic membrane normal.      Mouth/Throat:      Pharynx: Oropharynx is clear.   Eyes:      Conjunctiva/sclera: Conjunctivae normal.      Pupils: Pupils are equal, round, and reactive to light.   Cardiovascular:      Rate and Rhythm: Normal rate and regular rhythm.      Heart sounds: Normal heart sounds.   Pulmonary:      Effort: Pulmonary effort is normal.      Breath sounds: Normal breath sounds.   Abdominal:      Palpations: Abdomen is soft.      Tenderness: There is no abdominal tenderness.   Neurological:      Mental Status: She is oriented to person, place, and time.   Psychiatric:         Mood  and Affect: Mood normal.         Behavior: Behavior normal.             Assessment and Plan   Diagnoses and all orders for this visit:    1. Sore throat (Primary)  Offered to check strep test today- she declines; if symptoms persist recommend further evaluation.   2. Fibromyalgia  Refilled duloxetine at current dosage- denies side effects. RTC prior to recheck as needed.   3. Anxiety    Other orders  -     DULoxetine (CYMBALTA) 60 MG capsule; Take 1 capsule by mouth Daily.  Dispense: 90 capsule; Refill: 1            Follow Up   Return in about 6 months (around 5/13/2024).  Patient was given instructions and counseling regarding her condition or for health maintenance advice. Please see specific information pulled into the AVS if appropriate.     Diamante Julian PA-C

## 2024-02-06 ENCOUNTER — OFFICE VISIT (OUTPATIENT)
Dept: FAMILY MEDICINE CLINIC | Facility: CLINIC | Age: 49
End: 2024-02-06
Payer: COMMERCIAL

## 2024-02-06 VITALS
HEART RATE: 87 BPM | WEIGHT: 151 LBS | BODY MASS INDEX: 26.75 KG/M2 | SYSTOLIC BLOOD PRESSURE: 140 MMHG | OXYGEN SATURATION: 98 % | HEIGHT: 63 IN | DIASTOLIC BLOOD PRESSURE: 88 MMHG

## 2024-02-06 DIAGNOSIS — Z11.59 ENCOUNTER FOR HEPATITIS C SCREENING TEST FOR LOW RISK PATIENT: ICD-10-CM

## 2024-02-06 DIAGNOSIS — I10 HYPERTENSION, ESSENTIAL: Primary | ICD-10-CM

## 2024-02-06 DIAGNOSIS — Z12.31 ENCOUNTER FOR SCREENING MAMMOGRAM FOR MALIGNANT NEOPLASM OF BREAST: Primary | ICD-10-CM

## 2024-02-06 DIAGNOSIS — Z13.1 DIABETES MELLITUS SCREENING: ICD-10-CM

## 2024-02-06 DIAGNOSIS — Z13.220 LIPID SCREENING: ICD-10-CM

## 2024-02-06 PROCEDURE — 99214 OFFICE O/P EST MOD 30 MIN: CPT | Performed by: PHYSICIAN ASSISTANT

## 2024-02-06 RX ORDER — LISINOPRIL 10 MG/1
10 TABLET ORAL DAILY
Qty: 30 TABLET | Refills: 0 | Status: SHIPPED | OUTPATIENT
Start: 2024-02-06

## 2024-02-06 NOTE — PROGRESS NOTES
"Chief Complaint  Hypertension (Pt states BP has been running high for 2 weeks)    Subjective          Rebeca Fitzpatrick presents to [facility]  History of Present Illness  Patient in today for evaluation on elevated blood pressure readings over past 2+ weeks. Denies chest pain or shortness of breath. States bp has been in 150s/90s. Will rtc for fasting labs.   Hypertension  This is a new problem. The current episode started more than 1 month ago. The problem has been worse since onset. Associated symptoms include anxiety, blurred vision, headaches and malaise/fatigue. Pertinent negatives include no chest pain, palpitations or shortness of breath.       Objective   Vital Signs:   /88   Pulse 87   Ht 160 cm (63\")   Wt 68.5 kg (151 lb)   SpO2 98%   BMI 26.75 kg/m²     Body mass index is 26.75 kg/m².    Review of Systems   Constitutional:  Positive for malaise/fatigue.   HENT:  Negative for congestion and sore throat.    Eyes:  Positive for blurred vision.   Respiratory:  Negative for cough and shortness of breath.    Cardiovascular:  Negative for chest pain, palpitations and leg swelling.   Gastrointestinal:  Negative for abdominal pain, diarrhea, nausea and vomiting.   Neurological:  Negative for dizziness and headache.       Past History:  Medical History: has a past medical history of Anxiety, Chronic major depressive disorder, single episode (10/22/2015), Extremity pain, Fibromyalgia, primary, Headache, Migraine headache, and Neck pain.   Surgical History: has a past surgical history that includes Laser laparoscopy (2002).   Family History: family history includes Alcohol abuse in her father; Alzheimer's disease in an other family member; Brain cancer in her father; Depression in her maternal grandmother; Hyperlipidemia in her mother; Hypertension in her mother; Migraines in her sister; Obesity in her sister.   Social History: reports that she has never smoked. She has never " used smokeless tobacco. She reports that she does not drink alcohol and does not use drugs.      Current Outpatient Medications:     DULoxetine (CYMBALTA) 60 MG capsule, Take 1 capsule by mouth Daily., Disp: 90 capsule, Rfl: 1    ibuprofen (ADVIL,MOTRIN) 600 MG tablet, Take 1 tablet by mouth Every 6 (Six) Hours As Needed for Mild Pain., Disp: 30 tablet, Rfl: 2    Loratadine 10 MG capsule, Take  by mouth., Disp: , Rfl:     Nurtec 75 MG dispersible tablet, TAKE 1 TABLET BY MOUTH AS NEEDED FOR MIGRAINE, Disp: 9 tablet, Rfl: 0    ondansetron (Zofran) 4 MG tablet, Take 1 tablet by mouth Every 8 (Eight) Hours As Needed for Nausea or Vomiting., Disp: 20 tablet, Rfl: 1    lisinopril (PRINIVIL,ZESTRIL) 10 MG tablet, Take 1 tablet by mouth Daily., Disp: 30 tablet, Rfl: 0  Allergies: Patient has no known allergies.    Physical Exam  Constitutional:       Appearance: Normal appearance.   HENT:      Right Ear: Tympanic membrane normal.      Left Ear: Tympanic membrane normal.      Mouth/Throat:      Mouth: Mucous membranes are moist.   Eyes:      Pupils: Pupils are equal, round, and reactive to light.   Cardiovascular:      Rate and Rhythm: Regular rhythm.      Pulses: Normal pulses.      Heart sounds: Normal heart sounds.   Pulmonary:      Effort: Pulmonary effort is normal.      Breath sounds: Normal breath sounds.   Musculoskeletal:      Right lower leg: No edema.      Left lower leg: No edema.   Neurological:      Mental Status: She is alert and oriented to person, place, and time.   Psychiatric:         Mood and Affect: Mood normal.             Assessment and Plan   Diagnoses and all orders for this visit:    1. Hypertension, essential (Primary)  -     CBC & Differential  -     Comprehensive Metabolic Panel  -     TSH  With persistent elevation of readings, will start on low dosage of lisinopril and monitor blood pressure. Will rtc for fasting labs. Encouraged healthy diet and exercise. Recheck with bp readings in 2 weeks,  prior as needed. Recommend f/up with eye specialist if continued episodes of blurry vision.   2. Lipid screening  -     Lipid Panel  Fasting lipid panel with labs.   3. Diabetes mellitus screening  -     Hemoglobin A1c  Will check hga1c with labs.   4. Encounter for hepatitis C screening test for low risk patient  -     Hepatitis C Antibody    Other orders  -     lisinopril (PRINIVIL,ZESTRIL) 10 MG tablet; Take 1 tablet by mouth Daily.  Dispense: 30 tablet; Refill: 0            Follow Up   Return in about 2 weeks (around 2/20/2024).  Patient was given instructions and counseling regarding her condition or for health maintenance advice. Please see specific information pulled into the AVS if appropriate.     Diamante Julian PA-C

## 2024-02-12 ENCOUNTER — LAB (OUTPATIENT)
Dept: FAMILY MEDICINE CLINIC | Facility: CLINIC | Age: 49
End: 2024-02-12
Payer: COMMERCIAL

## 2024-02-13 LAB
ALBUMIN SERPL-MCNC: 4.7 G/DL (ref 3.9–4.9)
ALBUMIN/GLOB SERPL: 2 {RATIO} (ref 1.2–2.2)
ALP SERPL-CCNC: 96 IU/L (ref 44–121)
ALT SERPL-CCNC: 24 IU/L (ref 0–32)
AST SERPL-CCNC: 22 IU/L (ref 0–40)
BASOPHILS # BLD AUTO: 0.1 X10E3/UL (ref 0–0.2)
BASOPHILS NFR BLD AUTO: 1 %
BILIRUB SERPL-MCNC: 0.5 MG/DL (ref 0–1.2)
BUN SERPL-MCNC: 9 MG/DL (ref 6–24)
BUN/CREAT SERPL: 13 (ref 9–23)
CALCIUM SERPL-MCNC: 9.5 MG/DL (ref 8.7–10.2)
CHLORIDE SERPL-SCNC: 107 MMOL/L (ref 96–106)
CHOLEST SERPL-MCNC: 149 MG/DL (ref 100–199)
CO2 SERPL-SCNC: 19 MMOL/L (ref 20–29)
CREAT SERPL-MCNC: 0.71 MG/DL (ref 0.57–1)
EGFRCR SERPLBLD CKD-EPI 2021: 106 ML/MIN/1.73
EOSINOPHIL # BLD AUTO: 0.6 X10E3/UL (ref 0–0.4)
EOSINOPHIL NFR BLD AUTO: 7 %
ERYTHROCYTE [DISTWIDTH] IN BLOOD BY AUTOMATED COUNT: 12.4 % (ref 11.7–15.4)
GLOBULIN SER CALC-MCNC: 2.4 G/DL (ref 1.5–4.5)
GLUCOSE SERPL-MCNC: 102 MG/DL (ref 70–99)
HBA1C MFR BLD: 5.7 % (ref 4.8–5.6)
HCT VFR BLD AUTO: 42.2 % (ref 34–46.6)
HCV IGG SERPL QL IA: NON REACTIVE
HDLC SERPL-MCNC: 49 MG/DL
HGB BLD-MCNC: 14 G/DL (ref 11.1–15.9)
IMM GRANULOCYTES # BLD AUTO: 0 X10E3/UL (ref 0–0.1)
IMM GRANULOCYTES NFR BLD AUTO: 0 %
LDLC SERPL CALC-MCNC: 87 MG/DL (ref 0–99)
LYMPHOCYTES # BLD AUTO: 2.1 X10E3/UL (ref 0.7–3.1)
LYMPHOCYTES NFR BLD AUTO: 26 %
MCH RBC QN AUTO: 29.9 PG (ref 26.6–33)
MCHC RBC AUTO-ENTMCNC: 33.2 G/DL (ref 31.5–35.7)
MCV RBC AUTO: 90 FL (ref 79–97)
MONOCYTES # BLD AUTO: 0.7 X10E3/UL (ref 0.1–0.9)
MONOCYTES NFR BLD AUTO: 8 %
NEUTROPHILS # BLD AUTO: 4.9 X10E3/UL (ref 1.4–7)
NEUTROPHILS NFR BLD AUTO: 58 %
PLATELET # BLD AUTO: 294 X10E3/UL (ref 150–450)
POTASSIUM SERPL-SCNC: 4.3 MMOL/L (ref 3.5–5.2)
PROT SERPL-MCNC: 7.1 G/DL (ref 6–8.5)
RBC # BLD AUTO: 4.68 X10E6/UL (ref 3.77–5.28)
SODIUM SERPL-SCNC: 140 MMOL/L (ref 134–144)
TRIGL SERPL-MCNC: 62 MG/DL (ref 0–149)
TSH SERPL DL<=0.005 MIU/L-ACNC: 1.6 UIU/ML (ref 0.45–4.5)
VLDLC SERPL CALC-MCNC: 13 MG/DL (ref 5–40)
WBC # BLD AUTO: 8.3 X10E3/UL (ref 3.4–10.8)

## 2024-02-27 ENCOUNTER — TELEPHONE (OUTPATIENT)
Dept: FAMILY MEDICINE CLINIC | Facility: CLINIC | Age: 49
End: 2024-02-27

## 2024-02-27 ENCOUNTER — OFFICE VISIT (OUTPATIENT)
Dept: FAMILY MEDICINE CLINIC | Facility: CLINIC | Age: 49
End: 2024-02-27
Payer: COMMERCIAL

## 2024-02-27 VITALS
SYSTOLIC BLOOD PRESSURE: 120 MMHG | BODY MASS INDEX: 26.93 KG/M2 | OXYGEN SATURATION: 97 % | HEART RATE: 98 BPM | DIASTOLIC BLOOD PRESSURE: 86 MMHG | HEIGHT: 63 IN | WEIGHT: 152 LBS

## 2024-02-27 DIAGNOSIS — I10 HYPERTENSION, ESSENTIAL: Primary | ICD-10-CM

## 2024-02-27 PROCEDURE — 99213 OFFICE O/P EST LOW 20 MIN: CPT | Performed by: PHYSICIAN ASSISTANT

## 2024-02-27 RX ORDER — LISINOPRIL 10 MG/1
10 TABLET ORAL DAILY
Qty: 90 TABLET | Refills: 1 | Status: SHIPPED | OUTPATIENT
Start: 2024-02-27

## 2024-02-27 NOTE — PROGRESS NOTES
"Chief Complaint  Hypertension (Pt states blood pressure has improved )    Subjective          Rebeca Fitzpatrick presents to [facility]  History of Present Illness  Patient in today for medication recheck and refill.  She states her blood pressure has improved since starting on the lisinopril.  She denies any chest pain or shortness of breath.  She states she did notice a slight cough but also had a cold at the same time.  She feels like that is improving.  Denies any fever.  Denies any nausea, vomiting, or diarrhea.  She is currently scheduled for her her mammogram.  She is up-to-date on her fasting labs.  Hypertension  This is a recurrent problem. The current episode started 1 to 4 weeks ago. The problem has been stable since onset. The problem is controlled. Pertinent negatives include no anxiety, blurred vision, chest pain, headaches, malaise/fatigue, orthopnea, palpitations, peripheral edema or shortness of breath. There are no known risk factors for coronary artery disease. There are no compliance problems.        Objective   Vital Signs:   /86   Pulse 98   Ht 160 cm (63\")   Wt 68.9 kg (152 lb)   SpO2 97%   BMI 26.93 kg/m²     Body mass index is 26.93 kg/m².    Review of Systems   Constitutional:  Negative for fever and malaise/fatigue.   Eyes:  Negative for blurred vision.   Respiratory:  Positive for cough. Negative for shortness of breath.    Cardiovascular:  Negative for chest pain, palpitations and orthopnea.   Gastrointestinal:  Negative for abdominal pain, diarrhea, nausea and vomiting.   Neurological:  Negative for dizziness and headache.       Past History:  Medical History: has a past medical history of Anxiety, Chronic major depressive disorder, single episode (10/22/2015), Extremity pain, Fibromyalgia, primary, Headache, Migraine headache, and Neck pain.   Surgical History: has a past surgical history that includes Laser laparoscopy (2002).   Family History: " family history includes Alcohol abuse in her father; Alzheimer's disease in an other family member; Brain cancer in her father; Depression in her maternal grandmother; Hyperlipidemia in her mother; Hypertension in her mother; Migraines in her sister; Obesity in her sister.   Social History: reports that she has never smoked. She has never used smokeless tobacco. She reports that she does not drink alcohol and does not use drugs.      Current Outpatient Medications:     DULoxetine (CYMBALTA) 60 MG capsule, Take 1 capsule by mouth Daily., Disp: 90 capsule, Rfl: 1    ibuprofen (ADVIL,MOTRIN) 600 MG tablet, Take 1 tablet by mouth Every 6 (Six) Hours As Needed for Mild Pain., Disp: 30 tablet, Rfl: 2    lisinopril (PRINIVIL,ZESTRIL) 10 MG tablet, Take 1 tablet by mouth Daily., Disp: 90 tablet, Rfl: 1    Loratadine 10 MG capsule, Take  by mouth., Disp: , Rfl:     Nurtec 75 MG dispersible tablet, TAKE 1 TABLET BY MOUTH AS NEEDED FOR MIGRAINE, Disp: 9 tablet, Rfl: 0    ondansetron (Zofran) 4 MG tablet, Take 1 tablet by mouth Every 8 (Eight) Hours As Needed for Nausea or Vomiting., Disp: 20 tablet, Rfl: 1  Allergies: Patient has no known allergies.    Physical Exam  Constitutional:       Appearance: Normal appearance.   HENT:      Right Ear: Tympanic membrane normal.      Left Ear: Tympanic membrane normal.      Mouth/Throat:      Pharynx: Oropharynx is clear.   Eyes:      Conjunctiva/sclera: Conjunctivae normal.      Pupils: Pupils are equal, round, and reactive to light.   Cardiovascular:      Rate and Rhythm: Normal rate and regular rhythm.      Heart sounds: Normal heart sounds.   Pulmonary:      Effort: Pulmonary effort is normal.      Breath sounds: Normal breath sounds.   Abdominal:      Palpations: Abdomen is soft.      Tenderness: There is no abdominal tenderness.   Neurological:      Mental Status: She is oriented to person, place, and time.   Psychiatric:         Mood and Affect: Mood normal.         Behavior:  Behavior normal.             Assessment and Plan   Diagnoses and all orders for this visit:    1. Hypertension, essential (Primary)  Continue on current dosage of lisinopril at this time.  Encouraged to continue to monitor her blood pressure.  Encouraged to continue with healthy diet and exercise.  Will let us know if any lingering cough symptoms.  Return to clinic prior to recheck as needed.  Other orders  -     lisinopril (PRINIVIL,ZESTRIL) 10 MG tablet; Take 1 tablet by mouth Daily.  Dispense: 90 tablet; Refill: 1            Follow Up   No follow-ups on file.  Patient was given instructions and counseling regarding her condition or for health maintenance advice. Please see specific information pulled into the AVS if appropriate.     Diamante Julian PA-C

## 2024-03-18 NOTE — TELEPHONE ENCOUNTER
REFAXED ORDER TO [facility], CONTACT PT TO PROVIDE UPDATE. SHE WILL LET US KNOW IN A FEW DAYS IF THEY DO NOT CONTACT HER.

## 2024-05-07 RX ORDER — DULOXETIN HYDROCHLORIDE 60 MG/1
60 CAPSULE, DELAYED RELEASE ORAL DAILY
Qty: 30 CAPSULE | Refills: 0 | Status: SHIPPED | OUTPATIENT
Start: 2024-05-07 | End: 2024-05-13 | Stop reason: SDUPTHER

## 2024-05-13 ENCOUNTER — OFFICE VISIT (OUTPATIENT)
Dept: FAMILY MEDICINE CLINIC | Facility: CLINIC | Age: 49
End: 2024-05-13
Payer: COMMERCIAL

## 2024-05-13 VITALS
WEIGHT: 158 LBS | BODY MASS INDEX: 28 KG/M2 | HEART RATE: 94 BPM | OXYGEN SATURATION: 96 % | SYSTOLIC BLOOD PRESSURE: 130 MMHG | HEIGHT: 63 IN | DIASTOLIC BLOOD PRESSURE: 80 MMHG

## 2024-05-13 DIAGNOSIS — I10 HYPERTENSION, ESSENTIAL: Primary | ICD-10-CM

## 2024-05-13 DIAGNOSIS — R05.3 CHRONIC COUGH: ICD-10-CM

## 2024-05-13 DIAGNOSIS — Z82.49 FAMILY HISTORY OF EARLY CAD: ICD-10-CM

## 2024-05-13 DIAGNOSIS — D48.5 NEOPLASM OF UNCERTAIN BEHAVIOR OF SKIN: ICD-10-CM

## 2024-05-13 DIAGNOSIS — F41.1 GENERALIZED ANXIETY DISORDER: ICD-10-CM

## 2024-05-13 PROCEDURE — 99214 OFFICE O/P EST MOD 30 MIN: CPT | Performed by: PHYSICIAN ASSISTANT

## 2024-05-13 RX ORDER — DULOXETIN HYDROCHLORIDE 60 MG/1
60 CAPSULE, DELAYED RELEASE ORAL DAILY
Qty: 90 CAPSULE | Refills: 1 | Status: SHIPPED | OUTPATIENT
Start: 2024-05-13

## 2024-05-13 RX ORDER — LOSARTAN POTASSIUM 50 MG/1
50 TABLET ORAL DAILY
Qty: 90 TABLET | Refills: 1 | Status: SHIPPED | OUTPATIENT
Start: 2024-05-13

## 2024-05-13 NOTE — PROGRESS NOTES
"Chief Complaint  Hypertension (Pt states BP has been running high )    Subjective          Rebeca Fitzpatrick presents to [facility]  History of Present Illness  Patient in today for medication recheck and refills. States blood pressure has been often times above 140/90 but also states has been having issues with chronic cough that she feels is stemming from the lisinopril. Denies chest pain or shortness of breath. She would like to get in with cardiology for consult with family history of CAD- mom passed away from MI. She also has noticed dry raised spot that showed up to right side of forehead/face that has gotten a little larger in size. She states the duloxetine continues to work well for her anxiety symptoms and would like to continue at this time - denies side effects of medication.   Hypertension  This is a chronic problem. The current episode started more than 1 month ago. The problem has been stable since onset. The problem is controlled. Associated symptoms include anxiety. Pertinent negatives include no chest pain, headaches, palpitations, peripheral edema or shortness of breath. Risk factors for coronary artery disease include family history and obesity. There are no compliance problems.    Anxiety  Presents for follow-up visit.  Symptoms include nervous/anxious behavior.  Patient reports no chest pain, depressed mood, dizziness, nausea, palpitations or shortness of breath.       Objective   Vital Signs:   /80   Pulse 94   Ht 160 cm (63\")   Wt 71.7 kg (158 lb)   SpO2 96%   BMI 27.99 kg/m²     Body mass index is 27.99 kg/m².    Review of Systems   Constitutional:  Negative for fatigue and fever.   HENT:  Negative for congestion and sore throat.    Respiratory:  Positive for cough. Negative for shortness of breath and wheezing.    Cardiovascular:  Negative for chest pain and palpitations.   Gastrointestinal:  Negative for abdominal pain, diarrhea, nausea and vomiting. "   Neurological:  Negative for dizziness and headache.   Psychiatric/Behavioral:  Negative for depressed mood. The patient is nervous/anxious.        Past History:  Medical History: has a past medical history of Allergic, Anxiety, Arthritis, Chronic major depressive disorder, single episode (10/22/2015), Extremity pain, Fibromyalgia, primary, GERD (gastroesophageal reflux disease), Headache, Hypertension, Low back pain, Migraine headache, and Neck pain.   Surgical History: has a past surgical history that includes Laser laparoscopy (2002) and Eye surgery.   Family History: family history includes Alcohol abuse in her father; Alzheimer's disease in an other family member; Brain cancer in her father; Depression in her maternal grandmother; Hyperlipidemia in her mother; Hypertension in her mother; Migraines in her sister; Obesity in her sister.   Social History: reports that she has never smoked. She has never used smokeless tobacco. She reports that she does not drink alcohol and does not use drugs.      Current Outpatient Medications:     DULoxetine (CYMBALTA) 60 MG capsule, Take 1 capsule by mouth once daily, Disp: 30 capsule, Rfl: 0    ibuprofen (ADVIL,MOTRIN) 600 MG tablet, Take 1 tablet by mouth Every 6 (Six) Hours As Needed for Mild Pain., Disp: 30 tablet, Rfl: 2    Loratadine 10 MG capsule, Take  by mouth., Disp: , Rfl:     Nurtec 75 MG dispersible tablet, TAKE 1 TABLET BY MOUTH AS NEEDED FOR MIGRAINE, Disp: 9 tablet, Rfl: 0    ondansetron (Zofran) 4 MG tablet, Take 1 tablet by mouth Every 8 (Eight) Hours As Needed for Nausea or Vomiting., Disp: 20 tablet, Rfl: 1  Allergies: Patient has no known allergies.    Physical Exam  Constitutional:       Appearance: Normal appearance.   HENT:      Right Ear: Tympanic membrane normal.      Left Ear: Tympanic membrane normal.      Mouth/Throat:      Pharynx: Oropharynx is clear.   Eyes:      Conjunctiva/sclera: Conjunctivae normal.      Pupils: Pupils are equal, round, and  reactive to light.   Cardiovascular:      Rate and Rhythm: Normal rate and regular rhythm.      Heart sounds: Normal heart sounds.   Pulmonary:      Effort: Pulmonary effort is normal.      Breath sounds: Normal breath sounds.   Abdominal:      Palpations: Abdomen is soft.      Tenderness: There is no abdominal tenderness.   Skin:            Comments: Small slight raised, dry lesion to right side of forehead   Neurological:      Mental Status: She is oriented to person, place, and time.   Psychiatric:         Mood and Affect: Mood normal.         Behavior: Behavior normal.             Assessment and Plan   Diagnoses and all orders for this visit:    1. Hypertension, essential (Primary)  Will discontinue lisinopril with coughing; sent in losartan 50 mg once a day; monitor blood pressure and rtc if cough persists or if blood pressure not well controlled; will put in referral for consult per her request for cardiology  2. Generalized anxiety disorder  Refilled duloxetine- rtc prior to recheck as needed  3. Chronic cough  -     XR Chest PA & Lateral (In Office)  Will check chest xray with cough.   4. Neoplasm of uncertain behavior of skin  Patient given contact information to schedule dermatology consult- call us if needs any further assistance with scheduling        Follow Up   Return in about 6 months (around 11/13/2024).  Patient was given instructions and counseling regarding her condition or for health maintenance advice. Please see specific information pulled into the AVS if appropriate.     Diamante Julian PA-C

## 2024-06-07 ENCOUNTER — OFFICE VISIT (OUTPATIENT)
Dept: CARDIOLOGY | Facility: CLINIC | Age: 49
End: 2024-06-07
Payer: COMMERCIAL

## 2024-06-07 VITALS
WEIGHT: 162 LBS | SYSTOLIC BLOOD PRESSURE: 114 MMHG | TEMPERATURE: 97.1 F | DIASTOLIC BLOOD PRESSURE: 74 MMHG | OXYGEN SATURATION: 97 % | HEIGHT: 63 IN | BODY MASS INDEX: 28.7 KG/M2 | HEART RATE: 91 BPM

## 2024-06-07 DIAGNOSIS — I10 ESSENTIAL HYPERTENSION: Primary | ICD-10-CM

## 2024-06-07 DIAGNOSIS — R06.02 SHORTNESS OF BREATH: ICD-10-CM

## 2024-06-07 DIAGNOSIS — Z82.49 FAMILY HISTORY OF EARLY CAD: ICD-10-CM

## 2024-06-07 DIAGNOSIS — E78.00 PRIMARY HYPERCHOLESTEROLEMIA: ICD-10-CM

## 2024-06-07 PROCEDURE — 99204 OFFICE O/P NEW MOD 45 MIN: CPT | Performed by: INTERNAL MEDICINE

## 2024-06-07 PROCEDURE — 93000 ELECTROCARDIOGRAM COMPLETE: CPT | Performed by: INTERNAL MEDICINE

## 2024-06-07 NOTE — PROGRESS NOTES
Venipuncture Blood Specimen Collection  Venipuncture performed in clinic by Callie Treivño RN with good hemostasis. Patient tolerated the procedure well without complications.   06/07/24   Callie Treviño RN

## 2024-06-07 NOTE — ASSESSMENT & PLAN NOTE
Lipid abnormalities are improving with lifestyle modifications    Plan:  Lipid lowering therapy not prescribed due to lipid being to goal on therapeutic lifestyle changes.    Discussed medication dosage, use, side effects, and goals of treatment in detail.    Counseled patient on lifestyle modifications to help control hyperlipidemia.   Cholesterol lowering dietary information shared with patient.  Advised patient to exercise for 150 minutes weekly. (30 minute brisk walk, 5 days a week for example)  Weight Loss encouraged    Lab Results   Component Value Date    CHLPL 149 02/12/2024    TRIG 62 02/12/2024    HDL 49 02/12/2024    LDL 87 02/12/2024     Goal of therapy: LDL  mg/dL      Followup in 6 months.

## 2024-06-07 NOTE — ASSESSMENT & PLAN NOTE
Since patient had COVID.  Patient with baseline fast heart rate.  Chest x-ray normal.  Physical exam normal.  Differential includes deconditioning, CAD, CHF, lung disease, other. Plan:  Send proBNP with labs  Transthoracic echocardiogram to assess for dyspnea  Treadmill stress test for exercise capacity; if decreased, and no ischemic changes, will consider additional workup for breathing problem  Patient counseled in regards to heart healthy, low fat/ low cholesterol/low sat diet, daily exercise for 30 minutes, low to moderate intensity, and weight loss.

## 2024-06-07 NOTE — PROGRESS NOTES
[facility]  [facility]  [address] [place] [address]  Dept: [phone]  Dept Fax: [phone]    Date: 06/07/2024  Patient: Rebeca Fitzpatrick  YOB: 1975    New Patient Office Note    Consult Reason:  Ms. Rebeca Fitzpatrick is a 48 y.o. female who presents to the clinic to [facility] care, seen for FHx Premature Cardiovascular Disease, Hypertension, and Shortness of Breath.   Patient complaining of shortness of breath on moderate daily activities, onset since had COVID 3 years ago.  Patient does have a history of early onset CAD from the mother side (mother and grandmother passed away from heart attacks in their early 70s).  High blood pressure and heart disease runs in her family.  She has been on blood pressure medication for the last year and doing well on losartan 50 mg p.o. daily.  Patient denies angina, orthopnea, PND, palpitations, lightheadedness, syncope or medications side-effects.    The following portions of the patient's history were reviewed and updated as appropriate: allergies, current medications, past family history, past medical history, past social history, past surgical history, and problem list.    Medications: No Known Allergies   Current Outpatient Medications   Medication Instructions    DULoxetine (CYMBALTA) 60 mg, Oral, Daily    ibuprofen (ADVIL,MOTRIN) 600 mg, Oral, Every 6 Hours PRN    Loratadine 10 MG capsule Oral    losartan (COZAAR) 50 mg, Oral, Daily    Nurtec 75 MG dispersible tablet TAKE 1 TABLET BY MOUTH AS NEEDED FOR MIGRAINE    ondansetron (ZOFRAN) 4 mg, Oral, Every 8 Hours PRN       Subjective  Past Medical History:   Diagnosis Date    Allergic     Anxiety     Arthritis     Chronic major depressive disorder, single episode 10/22/2015    Extremity pain     Fibromyalgia, primary     GERD (gastroesophageal reflux disease)     Headache     Hypertension     Low back pain     Migraine headache     Neck pain        Past Surgical  "History:   Procedure Laterality Date    EYE SURGERY      LAPAROSCOPY WITH LASER  2002    [facility]       Family History   Problem Relation Age of Onset    Hypertension Mother     Hyperlipidemia Mother     Heart attack Mother     Brain cancer Father     Alcohol abuse Father     Migraines Sister     Obesity Sister     Depression Maternal Grandmother     Heart attack Maternal Grandmother     Alzheimer's disease Other         Social History     Socioeconomic History    Marital status: Unknown   Tobacco Use    Smoking status: Never    Smokeless tobacco: Never   Vaping Use    Vaping status: Never Used   Substance and Sexual Activity    Alcohol use: Never    Drug use: No    Sexual activity: Yes     Partners: Male       Objective  Vitals:    06/07/24 0939   BP: 114/74   BP Location: Right arm   Patient Position: Sitting   Cuff Size: Adult   Pulse: 91   Temp: 97.1 °F (36.2 °C)   TempSrc: Temporal   SpO2: 97%   Weight: 73.5 kg (162 lb)   Height: 160 cm (63\")     Vitals:    06/07/24 0939   BP: 114/74   BP Location: Right arm   Patient Position: Sitting   Cuff Size: Adult   Pulse: 91   Temp: 97.1 °F (36.2 °C)   TempSrc: Temporal   SpO2: 97%   Weight: 73.5 kg (162 lb)   Height: 160 cm (63\")        Physical Exam  Constitutional:       Appearance: Healthy appearance. Not in distress.   Eyes:      Pupils: Pupils are equal, round, and reactive to light.   HENT:    Mouth/Throat:      Mouth: Mucous membranes are moist.   Neck:      Vascular: No carotid bruit, hepatojugular reflux, JVD or JVR. JVD normal.   Pulmonary:      Effort: Pulmonary effort is normal.      Breath sounds: Normal breath sounds. No wheezing. No rhonchi. No rales.   Chest:      Chest wall: Not tender to palpatation.   Cardiovascular:      PMI at left midclavicular line. Normal rate. Regular rhythm. Normal S1 with normal intensity. Normal S2 with normal intensity.       Murmurs: There is no murmur.      No gallop.  No click. No rub.   Pulses:     Carotid: 4+ " "bilaterally.     Radial: 4+ bilaterally.     Popliteal: 4+ bilaterally.     Dorsalis pedis: 4+ bilaterally.  Edema:     Peripheral edema absent.   Abdominal:      General: There is no abdominal bruit.   Skin:     General: Skin is warm.   Neurological:      Mental Status: Alert and oriented to person, place and time.              Labs:  Lab Results   Component Value Date     02/12/2024    K 4.3 02/12/2024     (H) 02/12/2024    CO2 19 (L) 02/12/2024    BUN 9 02/12/2024    CREATININE 0.71 02/12/2024    CALCIUM 9.5 02/12/2024    BILITOT 0.5 02/12/2024    ALKPHOS 96 02/12/2024    ALT 24 02/12/2024    AST 22 02/12/2024    GLUCOSE 102 (H) 02/12/2024    ALBUMIN 4.7 02/12/2024     Lab Results   Component Value Date    WBC 8.3 02/12/2024    HGB 14.0 02/12/2024    HCT 42.2 02/12/2024     02/12/2024     No results found for: \"APTT\", \"INR\", \"PTT\"  No results found for: \"CKTOTAL\", \"TROPONINI\", \"TROPONINT\", \"CKMBINDEX\", \"BNP\"  No results found for: \"BNP\", \"PROBNP\"    Lab Results   Component Value Date    CHLPL 149 02/12/2024    TRIG 62 02/12/2024    HDL 49 02/12/2024    LDL 87 02/12/2024     Lab Results   Component Value Date    TSH 1.600 02/12/2024       The 10-year ASCVD risk score (Ken DK, et al., 2019) is: 0.7%    Values used to calculate the score:      Age: 48 years      Sex: Female      Is Non- : No      Diabetic: No      Tobacco smoker: No      Systolic Blood Pressure: 114 mmHg      Is BP treated: Yes      HDL Cholesterol: 49 mg/dL      Total Cholesterol: 149 mg/dL     CV Diagnostics:    ECG 12 Lead    Date/Time: 6/7/2024 9:43 AM  Performed by: Ham Juan MD    Authorized by: Ham Juan MD  Comparison: compared with previous ECG from 10/28/2004  Similar to previous ECG  Rhythm: sinus rhythm    Clinical impression: normal ECG        CXR: Results for orders placed in visit on 05/13/24    XR Chest PA & Lateral (In Office)    Narrative  DATE OF EXAM: 5/13/2024 11:35 " AM    PROCEDURE: XR CHEST PA AND LATERAL-    INDICATIONS: cough; R05.3-Chronic cough    COMPARISON: No comparisons available.    TECHNIQUE: Two radiologic views of the chest, PA and lateral, were  obtained.    FINDINGS:  Heart and pulmonary vessels mediastinal contours appear within normal  limits. There is old granulomatous disease. Lung fields are clear of  acute infiltrates or effusions. Bones appear normal.    Impression  No acute process.      This report was finalized on 5/15/2024 9:21 AM by Kaitlynn Adams MD.     ECHO/MUGA: No results found for this or any previous visit.     STRESS TESTS: No results found for this or any previous visit.     CARDIAC CATH: No results found for this or any previous visit.     DEVICES: No valid procedures specified.   HOLTER: No results found for this or any previous visit.     CT/MRI:  No results found for this or any previous visit.    VASCULAR: No valid procedures specified.     Assessment and Plan  Diagnoses and all orders for this visit:    1. Essential hypertension (Primary)  Assessment & Plan:  Hypertension is stable and controlled  Continue current treatment regimen.  Dietary sodium restriction.  Weight loss.  Regular aerobic exercise.  Ambulatory blood pressure monitoring.  Blood pressure will be reassessed in 6 months.    Orders:  -     Magnesium    2. Family history of early CAD  Assessment & Plan:  Patient with good cholesterol profile.  Unclear if polygenic risk for heart disease versus linked to a familial hypercholesterolemia condition.  Patient cholesterol well-controlled suggesting a polygenic risk versus her not being a carrier of the high cholesterol gene. Plan:  Patient counseled in regards to heart healthy, low fat/ low cholesterol/low sat diet, daily exercise for 30 minutes, low to moderate intensity, and weight loss.  Send fasting lipid profile and lipoprotein a  Treadmill stress test for diagnosis of CAD, in the setting of decreased exercise capacity  from shortness of breath  Coronary calcium scoring a consideration to decide on aggressive lipid-lowering therapy    Orders:  -     ECG 12 Lead  -     Lipid Panel  -     Lipoprotein A (LPA)  -     Treadmill Stress Test; Future    3. Primary hypercholesterolemia  Assessment & Plan:   Lipid abnormalities are improving with lifestyle modifications    Plan:  Lipid lowering therapy not prescribed due to lipid being to goal on therapeutic lifestyle changes.    Discussed medication dosage, use, side effects, and goals of treatment in detail.    Counseled patient on lifestyle modifications to help control hyperlipidemia.   Cholesterol lowering dietary information shared with patient.  Advised patient to exercise for 150 minutes weekly. (30 minute brisk walk, 5 days a week for example)  Weight Loss encouraged    Lab Results   Component Value Date    CHLPL 149 02/12/2024    TRIG 62 02/12/2024    HDL 49 02/12/2024    LDL 87 02/12/2024     Goal of therapy: LDL  mg/dL      Followup in 6 months.      4. Shortness of breath  Assessment & Plan:  Since patient had COVID.  Patient with baseline fast heart rate.  Chest x-ray normal.  Physical exam normal.  Differential includes deconditioning, CAD, CHF, lung disease, other. Plan:  Send proBNP with labs  Transthoracic echocardiogram to assess for dyspnea  Treadmill stress test for exercise capacity; if decreased, and no ischemic changes, will consider additional workup for breathing problem  Patient counseled in regards to heart healthy, low fat/ low cholesterol/low sat diet, daily exercise for 30 minutes, low to moderate intensity, and weight loss.    Orders:  -     proBNP  -     Adult Transthoracic Echo Complete W/ Cont if Necessary Per Protocol; Future         Return in about 6 months (around 12/7/2024) for Follow-up with Dr Juan.    There are no Patient Instructions on file for this visit.    Ham Juan MD

## 2024-06-07 NOTE — ASSESSMENT & PLAN NOTE
Patient with good cholesterol profile.  Unclear if polygenic risk for heart disease versus linked to a familial hypercholesterolemia condition.  Patient cholesterol well-controlled suggesting a polygenic risk versus her not being a carrier of the high cholesterol gene. Plan:  Patient counseled in regards to heart healthy, low fat/ low cholesterol/low sat diet, daily exercise for 30 minutes, low to moderate intensity, and weight loss.  Send fasting lipid profile and lipoprotein a  Treadmill stress test for diagnosis of CAD, in the setting of decreased exercise capacity from shortness of breath  Coronary calcium scoring a consideration to decide on aggressive lipid-lowering therapy

## 2024-06-08 LAB
CHOLEST SERPL-MCNC: 179 MG/DL (ref 100–199)
HDLC SERPL-MCNC: 50 MG/DL
LDLC SERPL CALC-MCNC: 117 MG/DL (ref 0–99)
TRIGL SERPL-MCNC: 64 MG/DL (ref 0–149)
VLDLC SERPL CALC-MCNC: 12 MG/DL (ref 5–40)

## 2024-06-10 LAB
LPA SERPL-SCNC: 27.8 NMOL/L
MAGNESIUM SERPL-MCNC: 2.1 MG/DL (ref 1.6–2.3)
NT-PROBNP SERPL-MCNC: 38 PG/ML (ref 0–249)

## 2024-06-12 ENCOUNTER — TELEPHONE (OUTPATIENT)
Dept: CARDIOLOGY | Facility: CLINIC | Age: 49
End: 2024-06-12
Payer: COMMERCIAL

## 2024-06-12 DIAGNOSIS — Z82.49 FAMILY HISTORY OF EARLY CAD: Primary | ICD-10-CM

## 2024-06-12 DIAGNOSIS — E78.00 PRIMARY HYPERCHOLESTEROLEMIA: ICD-10-CM

## 2024-06-12 NOTE — TELEPHONE ENCOUNTER
"----- Message from Ham Juan sent at 6/12/2024  1:07 PM EDT -----  Please inform patient that her Blood work was abnormal showing increased in her LDL cholesterol (\"bad\" cholesterol).  She has normal lipoprotein a levels.  She can try therapeutic lifestyle changes to try and lower her cholesterol levels again.  Coronary CT for calcium scoring is a consideration if you want to decide on lipid-lowering therapy (if elevated, we will strongly consider lipid-lowering medical therapy to mitigate the risk of heart attack).  Thank you!      Pt said she would like to have the coronary ct . She will work on diet changes too . Pt said thank you very much.   "

## 2024-06-24 ENCOUNTER — TELEPHONE (OUTPATIENT)
Dept: CARDIOLOGY | Facility: CLINIC | Age: 49
End: 2024-06-24
Payer: COMMERCIAL

## 2024-06-24 NOTE — TELEPHONE ENCOUNTER
----- Message from Callie CARSON sent at 6/24/2024  2:13 PM EDT -----    ----- Message -----  From: Ham Juan MD  Sent: 6/21/2024   5:30 PM EDT  To: [facility]    Please inform patient that her treadmill Stress Test was normal. Thank you!

## 2024-07-03 ENCOUNTER — TELEPHONE (OUTPATIENT)
Dept: CARDIOLOGY | Facility: CLINIC | Age: 49
End: 2024-07-03
Payer: COMMERCIAL

## 2024-07-03 NOTE — TELEPHONE ENCOUNTER
----- Message from Ham Juan sent at 7/3/2024  1:06 PM EDT -----  Please inform patient that her transthoracic echocardiogram was normal for age. Thank you!

## 2024-07-23 ENCOUNTER — HOSPITAL ENCOUNTER (OUTPATIENT)
Facility: HOSPITAL | Age: 49
Discharge: HOME | End: 2024-07-23
Admitting: INTERNAL MEDICINE

## 2024-07-23 DIAGNOSIS — E78.00 PRIMARY HYPERCHOLESTEROLEMIA: ICD-10-CM

## 2024-07-23 DIAGNOSIS — Z82.49 FAMILY HISTORY OF EARLY CAD: ICD-10-CM

## 2024-07-23 PROCEDURE — 75571 CT HRT W/O DYE W/CA TEST: CPT

## 2024-07-26 ENCOUNTER — TELEPHONE (OUTPATIENT)
Dept: CARDIOLOGY | Facility: CLINIC | Age: 49
End: 2024-07-26
Payer: COMMERCIAL

## 2024-07-26 NOTE — TELEPHONE ENCOUNTER
----- Message from Ham Juan sent at 7/25/2024 10:04 PM EDT -----  Please inform patient that her Calcium scoring CT scan was normal at 0, low risk.   Thank you!

## 2024-11-04 ENCOUNTER — OFFICE VISIT (OUTPATIENT)
Dept: FAMILY MEDICINE CLINIC | Facility: CLINIC | Age: 49
End: 2024-11-04
Payer: COMMERCIAL

## 2024-11-04 VITALS
BODY MASS INDEX: 28.17 KG/M2 | HEART RATE: 96 BPM | RESPIRATION RATE: 16 BRPM | DIASTOLIC BLOOD PRESSURE: 98 MMHG | TEMPERATURE: 98.1 F | HEIGHT: 63 IN | WEIGHT: 159 LBS | SYSTOLIC BLOOD PRESSURE: 140 MMHG | OXYGEN SATURATION: 98 %

## 2024-11-04 DIAGNOSIS — I10 ESSENTIAL HYPERTENSION: Primary | ICD-10-CM

## 2024-11-04 DIAGNOSIS — F41.1 GENERALIZED ANXIETY DISORDER: ICD-10-CM

## 2024-11-04 DIAGNOSIS — R73.9 HYPERGLYCEMIA: ICD-10-CM

## 2024-11-04 DIAGNOSIS — M79.7 FIBROMYALGIA: ICD-10-CM

## 2024-11-04 PROCEDURE — 99214 OFFICE O/P EST MOD 30 MIN: CPT | Performed by: PHYSICIAN ASSISTANT

## 2024-11-04 RX ORDER — LOSARTAN POTASSIUM 50 MG/1
50 TABLET ORAL DAILY
Qty: 90 TABLET | Refills: 1 | Status: SHIPPED | OUTPATIENT
Start: 2024-11-04

## 2024-11-04 RX ORDER — DULOXETIN HYDROCHLORIDE 60 MG/1
60 CAPSULE, DELAYED RELEASE ORAL DAILY
Qty: 90 CAPSULE | Refills: 1 | Status: SHIPPED | OUTPATIENT
Start: 2024-11-04

## 2024-11-04 NOTE — PROGRESS NOTES
"Chief Complaint  Follow-up and Hypertension    Subjective          Rebeca Fitzpatrick presents to [facility]  History of Present Illness  Patient in today for  routine physical exam and fasting labs.  She states she has been feeling well.  She ran out of her blood pressure medicine 3 days ago and just took 1 dose about 20 minutes ago.  She denies any chest pain or shortness of breath.  She states prior to this, her blood pressure had been doing well.  She is due for her Pap smear and she states will call and schedule her own appointment.  She is currently up-to-date on her she states she has kept hoarseness for the last month.  Has been taking loratadine for postnasal drainage which has helped but the hoarseness has remained.  Hypertension  This is a chronic problem. Associated symptoms include anxiety. Pertinent negatives include no chest pain, palpitations, peripheral edema or shortness of breath.   Anxiety  Presents for follow-up visit.  Symptoms include nervous/anxious behavior.  Patient reports no chest pain, depressed mood, dizziness, nausea, palpitations or shortness of breath.       Objective   Vital Signs:   /98   Pulse 96   Temp 98.1 °F (36.7 °C)   Resp 16   Ht 160 cm (62.99\")   Wt 72.1 kg (159 lb)   SpO2 98%   BMI 28.17 kg/m²     Body mass index is 28.17 kg/m².    Review of Systems   Constitutional:  Negative for fever.   HENT:  Positive for voice change. Negative for congestion and sore throat.    Respiratory:  Negative for cough and shortness of breath.    Cardiovascular:  Negative for chest pain and palpitations.   Gastrointestinal:  Negative for abdominal pain, diarrhea, nausea and vomiting.   Neurological:  Negative for dizziness and headache.   Psychiatric/Behavioral:  Negative for depressed mood. The patient is nervous/anxious.        Past History:  Medical History: has a past medical history of Allergic, Anxiety, Arthritis, Chronic major depressive " disorder, single episode (10/22/2015), Extremity pain, Fibromyalgia, primary, GERD (gastroesophageal reflux disease), Headache, Hypertension, Low back pain, Migraine headache, and Neck pain.   Surgical History: has a past surgical history that includes Laser laparoscopy (2002) and Eye surgery.   Family History: family history includes Alcohol abuse in her father; Alzheimer's disease in an other family member; Brain cancer in her father; Depression in her maternal grandmother; Heart attack in her maternal grandmother and mother; Hyperlipidemia in her mother; Hypertension in her mother; Migraines in her sister; Obesity in her sister.   Social History: reports that she has never smoked. She has never used smokeless tobacco. She reports that she does not drink alcohol and does not use drugs.      Current Outpatient Medications:     DULoxetine (CYMBALTA) 60 MG capsule, Take 1 capsule by mouth Daily., Disp: 90 capsule, Rfl: 1    ibuprofen (ADVIL,MOTRIN) 600 MG tablet, Take 1 tablet by mouth Every 6 (Six) Hours As Needed for Mild Pain., Disp: 30 tablet, Rfl: 2    Loratadine 10 MG capsule, Take  by mouth., Disp: , Rfl:     losartan (Cozaar) 50 MG tablet, Take 1 tablet by mouth Daily., Disp: 90 tablet, Rfl: 1    Nurtec 75 MG dispersible tablet, TAKE 1 TABLET BY MOUTH AS NEEDED FOR MIGRAINE, Disp: 9 tablet, Rfl: 0    ondansetron (Zofran) 4 MG tablet, Take 1 tablet by mouth Every 8 (Eight) Hours As Needed for Nausea or Vomiting., Disp: 20 tablet, Rfl: 1  Allergies: Patient has no known allergies.    Physical Exam  Constitutional:       Appearance: Normal appearance.   HENT:      Right Ear: Tympanic membrane normal.      Left Ear: Tympanic membrane normal.      Mouth/Throat:      Mouth: Mucous membranes are moist.      Pharynx: Oropharynx is clear.   Eyes:      Conjunctiva/sclera: Conjunctivae normal.      Pupils: Pupils are equal, round, and reactive to light.   Cardiovascular:      Rate and Rhythm: Normal rate and regular  rhythm.      Heart sounds: Normal heart sounds.   Pulmonary:      Effort: Pulmonary effort is normal.      Breath sounds: Normal breath sounds.   Abdominal:      Palpations: Abdomen is soft.      Tenderness: There is no abdominal tenderness.   Neurological:      Mental Status: She is alert and oriented to person, place, and time.   Psychiatric:         Mood and Affect: Mood normal.         Behavior: Behavior normal.             Assessment and Plan   Diagnoses and all orders for this visit:    1. Essential hypertension (Primary)  -     CBC & Differential  -     Comprehensive Metabolic Panel  -     Lipid Panel  -     TSH  Will continue on current dosage however will have patient monitor bp over next week to make sure back in normal range. If continues to remain elevated after back on medication daily, will need to adjust dosage. Fasting labs today. Encouraged healthy diet and exercise.   2. Hyperglycemia  -     Hemoglobin A1c  Will check hga1c with labs.   3. Fibromyalgia  Refilled duloxetine- she is pleased with the current dosage for anxiety and fibromyalgia symptoms. Denies side effects. RTC prior to recheck as needed   4. Generalized anxiety disorder    Other orders  -     DULoxetine (CYMBALTA) 60 MG capsule; Take 1 capsule by mouth Daily.  Dispense: 90 capsule; Refill: 1  -     losartan (Cozaar) 50 MG tablet; Take 1 tablet by mouth Daily.  Dispense: 90 tablet; Refill: 1            Follow Up   Return in about 6 months (around 5/4/2025).  Patient was given instructions and counseling regarding her condition or for health maintenance advice. Please see specific information pulled into the AVS if appropriate.     Diamante Julian PA-C

## 2024-11-05 LAB
ALBUMIN SERPL-MCNC: 4.4 G/DL (ref 3.9–4.9)
ALP SERPL-CCNC: 75 IU/L (ref 44–121)
ALT SERPL-CCNC: 19 IU/L (ref 0–32)
AST SERPL-CCNC: 22 IU/L (ref 0–40)
BASOPHILS # BLD AUTO: 0.1 X10E3/UL (ref 0–0.2)
BASOPHILS NFR BLD AUTO: 1 %
BILIRUB SERPL-MCNC: 0.5 MG/DL (ref 0–1.2)
BUN SERPL-MCNC: 8 MG/DL (ref 6–24)
BUN/CREAT SERPL: 11 (ref 9–23)
CALCIUM SERPL-MCNC: 9.2 MG/DL (ref 8.7–10.2)
CHLORIDE SERPL-SCNC: 105 MMOL/L (ref 96–106)
CHOLEST SERPL-MCNC: 173 MG/DL (ref 100–199)
CO2 SERPL-SCNC: 21 MMOL/L (ref 20–29)
CREAT SERPL-MCNC: 0.76 MG/DL (ref 0.57–1)
EGFRCR SERPLBLD CKD-EPI 2021: 98 ML/MIN/1.73
EOSINOPHIL # BLD AUTO: 0.3 X10E3/UL (ref 0–0.4)
EOSINOPHIL NFR BLD AUTO: 4 %
ERYTHROCYTE [DISTWIDTH] IN BLOOD BY AUTOMATED COUNT: 11.9 % (ref 11.7–15.4)
GLOBULIN SER CALC-MCNC: 2.6 G/DL (ref 1.5–4.5)
GLUCOSE SERPL-MCNC: 107 MG/DL (ref 70–99)
HBA1C MFR BLD: 5.9 % (ref 4.8–5.6)
HCT VFR BLD AUTO: 41.9 % (ref 34–46.6)
HDLC SERPL-MCNC: 49 MG/DL
HGB BLD-MCNC: 13.3 G/DL (ref 11.1–15.9)
IMM GRANULOCYTES # BLD AUTO: 0 X10E3/UL (ref 0–0.1)
IMM GRANULOCYTES NFR BLD AUTO: 0 %
LDLC SERPL CALC-MCNC: 114 MG/DL (ref 0–99)
LYMPHOCYTES # BLD AUTO: 2 X10E3/UL (ref 0.7–3.1)
LYMPHOCYTES NFR BLD AUTO: 33 %
MCH RBC QN AUTO: 29.2 PG (ref 26.6–33)
MCHC RBC AUTO-ENTMCNC: 31.7 G/DL (ref 31.5–35.7)
MCV RBC AUTO: 92 FL (ref 79–97)
MONOCYTES # BLD AUTO: 0.4 X10E3/UL (ref 0.1–0.9)
MONOCYTES NFR BLD AUTO: 7 %
NEUTROPHILS # BLD AUTO: 3.4 X10E3/UL (ref 1.4–7)
NEUTROPHILS NFR BLD AUTO: 55 %
PLATELET # BLD AUTO: 309 X10E3/UL (ref 150–450)
POTASSIUM SERPL-SCNC: 4.1 MMOL/L (ref 3.5–5.2)
PROT SERPL-MCNC: 7 G/DL (ref 6–8.5)
RBC # BLD AUTO: 4.55 X10E6/UL (ref 3.77–5.28)
SODIUM SERPL-SCNC: 139 MMOL/L (ref 134–144)
TRIGL SERPL-MCNC: 48 MG/DL (ref 0–149)
TSH SERPL DL<=0.005 MIU/L-ACNC: 1.02 UIU/ML (ref 0.45–4.5)
VLDLC SERPL CALC-MCNC: 10 MG/DL (ref 5–40)
WBC # BLD AUTO: 6.1 X10E3/UL (ref 3.4–10.8)

## 2024-11-14 DIAGNOSIS — R49.0 HOARSENESS: Primary | ICD-10-CM

## 2024-12-06 ENCOUNTER — OFFICE VISIT (OUTPATIENT)
Dept: CARDIOLOGY | Facility: CLINIC | Age: 49
End: 2024-12-06
Payer: COMMERCIAL

## 2024-12-06 VITALS
HEIGHT: 63 IN | HEART RATE: 102 BPM | RESPIRATION RATE: 18 BRPM | SYSTOLIC BLOOD PRESSURE: 130 MMHG | BODY MASS INDEX: 28.77 KG/M2 | DIASTOLIC BLOOD PRESSURE: 86 MMHG | OXYGEN SATURATION: 93 % | WEIGHT: 162.4 LBS

## 2024-12-06 DIAGNOSIS — I10 ESSENTIAL HYPERTENSION: Primary | ICD-10-CM

## 2024-12-06 DIAGNOSIS — Z82.49 FAMILY HISTORY OF EARLY CAD: ICD-10-CM

## 2024-12-06 DIAGNOSIS — R06.02 SHORTNESS OF BREATH: ICD-10-CM

## 2024-12-06 DIAGNOSIS — E78.00 PRIMARY HYPERCHOLESTEROLEMIA: ICD-10-CM

## 2024-12-06 PROCEDURE — 99214 OFFICE O/P EST MOD 30 MIN: CPT | Performed by: INTERNAL MEDICINE

## 2024-12-06 NOTE — ASSESSMENT & PLAN NOTE
Hypertension is stable and controlled.  In office borderline elevated, at home to target most of the time less than 130/80.  Continue current treatment regimen.  Dietary sodium restriction.  Weight loss.  Regular aerobic exercise.  Ambulatory blood pressure monitoring.  Blood pressure will be reassessed in 1 year.

## 2024-12-06 NOTE — ASSESSMENT & PLAN NOTE
Doing well, at baseline today.  Since patient had COVID.  Chest x-ray normal.  Physical exam normal.  Differential includes deconditioning, CAD, CHF, lung disease, other.  proBNP normal, stress test normal, transthoracic echocardiogram age-appropriate.  Plan:  Patient counseled in regards to heart healthy, low fat/ low cholesterol/low sat diet, daily exercise for 30 minutes, low to moderate intensity, and weight loss.  If symptomatic/worsening, consider pulmonary evaluation; clinical follow-up for now

## 2024-12-06 NOTE — PATIENT INSTRUCTIONS
[facility] [place]  [facility] CARDIOLOGY  [address] [place] [address]  Dept: [phone]  Dept Fax: [phone]    Date:   Patient: Rebeca Fitzpatrick    Blood Pressure Log  Goal blood Pressure <130/80          Provided By: Ham Juan MD    Date Blood Pressure Heart Rate Comments   Saturday December 7, 2024       [place] December 8, 2024       Monday December 9, 2024       Tuesday December 10, 2024       Wednesday December 11, 2024       Thursday December 12, 2024       Friday December 13, 2024       Saturday December 14, 2024       [place] December 15, 2024       Monday December 16, 2024       Tuesday December 17, 2024       Wednesday December 18, 2024       Thursday December 19, 2024       Friday December 20, 2024       Saturday December 21, 2024       [place] December 22, 2024       Monday December 23, 2024       Tuesday December 24, 2024       Wednesday December 25, 2024       Thursday December 26, 2024       Friday December 27, 2024       Saturday December 28, 2024       [place] December 29, 2024       Monday December 30, 2024       Tuesday December 31, 2024       Wednesday January 1, 2025       Thursday January 2, 2025       Friday January 3, 2025       Saturday January 4, 2025       [place] January 5, 2025       Monday January 6, 2025       Tuesday January 7, 2025       Wednesday January 8, 2025       Thursday January 9, 2025       Friday January 10, 2025       Saturday January 11, 2025       [place] January 12, 2025       Monday January 13, 2025       Tuesday January 14, 2025       Wednesday January 15, 2025       Thursday January 16, 2025       Friday January 17, 2025       Saturday January 18, 2025       [place] January 19, 2025       Monday January 20, 2025       Tuesday January 21, 2025       Wednesday January 22, 2025       Thursday January 23, 2025       Friday January 24, 2025       Saturday January 25, 2025       [place] January 26, 2025       Monday January 27, 2025        Tuesday January 28, 2025       Wednesday January 29, 2025       Thursday January 30, 2025       Friday January 31, 2025       Saturday February 1, 2025       [place] February 2, 2025       Monday February 3, 2025

## 2024-12-06 NOTE — ASSESSMENT & PLAN NOTE
Lipid abnormalities are improving with lifestyle modifications     Plan:  Lipid lowering therapy not prescribed due to lipid being to goal on therapeutic lifestyle changes.     Calcium score of 0.  Discussed medication dosage, use, side effects, and goals of treatment in detail.    Counseled patient on lifestyle modifications to help control hyperlipidemia.   Cholesterol lowering dietary information shared with patient.  Advised patient to exercise for 150 minutes weekly. (30 minute brisk walk, 5 days a week for example)  Weight Loss encouraged     Lab Results   Component Value Date     (H) 11/04/2024     (H) 06/07/2024    HDL 49 11/04/2024    HDL 50 06/07/2024    CHLPL 173 11/04/2024    CHLPL 179 06/07/2024    TRIG 48 11/04/2024    TRIG 64 06/07/2024     Goal of therapy: LDL  mg/dL; focus on therapeutic lifestyle changes for now in view of calcium score 0        Followup in 1 year

## 2024-12-06 NOTE — ASSESSMENT & PLAN NOTE
Patient with acceptable cholesterol profile.  Lipoprotein a normal.  Unclear if polygenic risk for heart disease versus linked to a familial hypercholesterolemia condition.  Patient cholesterol well-controlled suggesting a polygenic risk versus her not being a carrier of the high cholesterol gene.  Stress test negative.  Coronary calcium scoring of 0.  Plan:  Patient counseled in regards to heart healthy, low fat/ low cholesterol/low sat diet, daily exercise for 30 minutes, low to moderate intensity, and weight loss.  Can hold off statin therapy for now in view of very low risk of cardiovascular events  Follow-up blood pressure control as above

## 2024-12-06 NOTE — PROGRESS NOTES
"[facility]  [facility] CARDIOLOGY  [address] [place] [address]  Dept: [phone]  Dept Fax: [phone]    Date: 12/06/2024  Patient: Rebeca Fitzpatrick  YOB: 1975    Follow Up Office Visit Note    Interval Follow-up  Ms. Rebeca Fitzpatrick is a 49 y.o. female who is here for follow-up on essential hypertension.    Subjective   Patient doing well with no complaints.  Patient denies angina, orthopnea, PND, palpitations, lightheadedness, syncope or medications side-effects.      The following portions of the patient's history were reviewed and updated as appropriate: allergies, current medications, past family history, past medical history, past social history, past surgical history, and problem list.    Medications: No Known Allergies   Current Outpatient Medications   Medication Instructions    DULoxetine (CYMBALTA) 60 mg, Oral, Daily    ibuprofen (ADVIL,MOTRIN) 600 mg, Oral, Every 6 Hours PRN    Loratadine 10 MG capsule Take  by mouth.    losartan (COZAAR) 50 mg, Oral, Daily    Nurtec 75 MG dispersible tablet TAKE 1 TABLET BY MOUTH AS NEEDED FOR MIGRAINE    ondansetron (ZOFRAN) 4 mg, Oral, Every 8 Hours PRN       Tobacco Use: Low Risk  (12/6/2024)    Patient History     Smoking Tobacco Use: Never     Smokeless Tobacco Use: Never     Passive Exposure: Not on file        Objective  Vitals:    12/06/24 1039   BP: 130/86   Pulse: 102   Resp: 18   SpO2: 93%   Weight: 73.7 kg (162 lb 6.4 oz)   Height: 160 cm (62.99\")   PainSc: 0-No pain      Vitals:    12/06/24 1039   BP: 130/86   Pulse: 102   Resp: 18   SpO2: 93%   Weight: 73.7 kg (162 lb 6.4 oz)   Height: 160 cm (62.99\")          Physical Exam  Constitutional:       Appearance: Not in distress.   Neck:      Vascular: JVD normal.   Pulmonary:      Breath sounds: Normal breath sounds.   Cardiovascular:      Normal rate. Regular rhythm.      Murmurs: There is no murmur.   Pulses:     Intact distal pulses. " "  Edema:     Peripheral edema absent.   Neurological:      Mental Status: Alert.              Diagnostic Data  Lab Results   Component Value Date     11/04/2024    K 4.1 11/04/2024     11/04/2024    CO2 21 11/04/2024    MG 2.1 06/07/2024    BUN 8 11/04/2024    CREATININE 0.76 11/04/2024    CALCIUM 9.2 11/04/2024    BILITOT 0.5 11/04/2024    ALKPHOS 75 11/04/2024    ALT 19 11/04/2024    AST 22 11/04/2024    GLUCOSE 107 (H) 11/04/2024    ALBUMIN 4.4 11/04/2024     Lab Results   Component Value Date    WBC 6.1 11/04/2024    HGB 13.3 11/04/2024    HCT 41.9 11/04/2024     11/04/2024     No results found for: \"APTT\", \"INR\", \"PTT\"  No results found for: \"CKTOTAL\", \"TROPONINI\", \"TROPONINT\", \"CKMBINDEX\", \"BNP\"  Lab Results   Component Value Date    PROBNP 38 06/07/2024       Lab Results   Component Value Date    CHLPL 173 11/04/2024    TRIG 48 11/04/2024    HDL 49 11/04/2024     (H) 11/04/2024     Lab Results   Component Value Date    TSH 1.020 11/04/2024       CV Diagnostics:  Procedures    CXR: Results for orders placed in visit on 05/13/24    XR Chest PA & Lateral (In Office)    Narrative  DATE OF EXAM: 5/13/2024 11:35 AM    PROCEDURE: XR CHEST PA AND LATERAL-    INDICATIONS: cough; R05.3-Chronic cough    COMPARISON: No comparisons available.    TECHNIQUE: Two radiologic views of the chest, PA and lateral, were  obtained.    FINDINGS:  Heart and pulmonary vessels mediastinal contours appear within normal  limits. There is old granulomatous disease. Lung fields are clear of  acute infiltrates or effusions. Bones appear normal.    Impression  No acute process.      This report was finalized on 5/15/2024 9:21 AM by Kaitlynn Adams MD.     ECHO/MUGA: Results for orders placed in visit on 07/02/24    Adult Transthoracic Echo Complete W/ Cont if Necessary Per Protocol    Interpretation Summary    Left ventricular systolic function is normal. Estimated left ventricular EF = 56% Left ventricular ejection " fraction appears to be 56 - 60%.    Left ventricular diastolic function is consistent with age.     STRESS TESTS: Results for orders placed in visit on 06/21/24    Treadmill Stress Test    Interpretation Summary    Findings consistent with a normal ECG stress test.    No ECG evidence of myocardial ischemia.    Negative clinical evidence of myocardial ischemia.     CARDIAC CATH: No results found for this or any previous visit.     DEVICES: No valid procedures specified.   HOLTER: No results found for this or any previous visit.     CT/MRI:  Results for orders placed during the hospital encounter of 07/23/24    CT Cardiac Calcium Score Without Dye    Narrative  CT CARDIAC CALCIUM SCORE WO DYE    Date of Exam: 7/23/2024 7:32 AM EDT    Indication: Hyperlipidemia with family history of coronary artery disease..    Comparison: Chest x-ray 5/13/2024    Technique: Multiple thin section CT axial images were obtained with prospective ECG-gating without intravenous contrast.  Automated exposure control and iterative reconstruction methods were used.    Findings:  Agatston scores for individual coronary arteries are as follows:  Left main (LM): 0  Left anterior descending (LAD): 0  Left circumflex (CX): 0  Right coronary artery (RCA): 0  Total 0    Findings of prior granulomatous disease exposure.    Impression  Impression:  Negative examination. No identifiable atherosclerotic plaque.    Calcium Score Interpretation  0 -- Negative examination, no identifiable atherosclerotic plaque.  Very low risk of cardiac events in the next 5 years.  1-10 -- Minimal plaque burden.  Low risk of cardiac events in the next 5 years.  11- 100 -- Mild Plaque burden.  Mild risk of cardiac events in the next 5 years.  101 - 400 -- Moderate plaque burden.  Moderate risk of cardiac events in the next 5 years.  Over 400 -- Extensive plaque burden.  High risk of cardiac events in the next 5 years.      Electronically Signed: Deya Leach MD  7/23/2024  6:17 PM EDT  Workstation ID: [id number]    VASCULAR: No valid procedures specified.     Assessment and Plan  Diagnoses and all orders for this visit:    1. Essential hypertension (Primary)  Assessment & Plan:  Hypertension is stable and controlled.  In office borderline elevated, at home to target most of the time less than 130/80.  Continue current treatment regimen.  Dietary sodium restriction.  Weight loss.  Regular aerobic exercise.  Ambulatory blood pressure monitoring.  Blood pressure will be reassessed in 1 year.      2. Family history of early CAD  Assessment & Plan:  Patient with acceptable cholesterol profile.  Lipoprotein a normal.  Unclear if polygenic risk for heart disease versus linked to a familial hypercholesterolemia condition.  Patient cholesterol well-controlled suggesting a polygenic risk versus her not being a carrier of the high cholesterol gene.  Stress test negative.  Coronary calcium scoring of 0.  Plan:  Patient counseled in regards to heart healthy, low fat/ low cholesterol/low sat diet, daily exercise for 30 minutes, low to moderate intensity, and weight loss.  Can hold off statin therapy for now in view of very low risk of cardiovascular events  Follow-up blood pressure control as above      3. Primary hypercholesterolemia  Assessment & Plan:   Lipid abnormalities are improving with lifestyle modifications     Plan:  Lipid lowering therapy not prescribed due to lipid being to goal on therapeutic lifestyle changes.     Calcium score of 0.  Discussed medication dosage, use, side effects, and goals of treatment in detail.    Counseled patient on lifestyle modifications to help control hyperlipidemia.   Cholesterol lowering dietary information shared with patient.  Advised patient to exercise for 150 minutes weekly. (30 minute brisk walk, 5 days a week for example)  Weight Loss encouraged     Lab Results   Component Value Date     (H) 11/04/2024     (H) 06/07/2024    HDL 49  11/04/2024    HDL 50 06/07/2024    CHLPL 173 11/04/2024    CHLPL 179 06/07/2024    TRIG 48 11/04/2024    TRIG 64 06/07/2024     Goal of therapy: LDL  mg/dL; focus on therapeutic lifestyle changes for now in view of calcium score 0        Followup in 1 year      4. Shortness of breath  Assessment & Plan:  Doing well, at baseline today.  Since patient had COVID.  Chest x-ray normal.  Physical exam normal.  Differential includes deconditioning, CAD, CHF, lung disease, other.  proBNP normal, stress test normal, transthoracic echocardiogram age-appropriate.  Plan:  Patient counseled in regards to heart healthy, low fat/ low cholesterol/low sat diet, daily exercise for 30 minutes, low to moderate intensity, and weight loss.  If symptomatic/worsening, consider pulmonary evaluation; clinical follow-up for now           Return in about 1 year (around 12/6/2025) for Follow-up with Dr Juan.    Patient Instructions   [facility]  [facility] CARDIOLOGY  [address] [place] [address]  Dept: [phone]  Dept Fax: [phone]    Date:   Patient: Rebeca Fitzpatrick    Blood Pressure Log  Goal blood Pressure <130/80          Provided By: Ham Juan MD    Date Blood Pressure Heart Rate Comments   Saturday December 7, 2024       [place] December 8, 2024       Monday December 9, 2024       Tuesday December 10, 2024       Wednesday December 11, 2024       Thursday December 12, 2024       Friday December 13, 2024       Saturday December 14, 2024       [place] December 15, 2024       Monday December 16, 2024       Tuesday December 17, 2024       Wednesday December 18, 2024       Thursday December 19, 2024       Friday December 20, 2024       Saturday December 21, 2024       [place] December 22, 2024       Monday December 23, 2024       Tuesday December 24, 2024       Wednesday December 25, 2024       Thursday December 26, 2024       Friday December 27, 2024       Saturday December 28,  2024       [place] December 29, 2024       Monday December 30, 2024       Tuesday December 31, 2024       Wednesday January 1, 2025       Thursday January 2, 2025       Friday January 3, 2025       Saturday January 4, 2025       [place] January 5, 2025       Monday January 6, 2025       Tuesday January 7, 2025       Wednesday January 8, 2025       Thursday January 9, 2025       Friday January 10, 2025       Saturday January 11, 2025       [place] January 12, 2025       Monday January 13, 2025       Tuesday January 14, 2025       Wednesday January 15, 2025       Thursday January 16, 2025       Friday January 17, 2025       Saturday January 18, 2025       [place] January 19, 2025       Monday January 20, 2025       Tuesday January 21, 2025       Wednesday January 22, 2025       Thursday January 23, 2025       Friday January 24, 2025       Saturday January 25, 2025       [place] January 26, 2025       Monday January 27, 2025       Tuesday January 28, 2025       Wednesday January 29, 2025       Thursday January 30, 2025       Friday January 31, 2025       Saturday February 1, 2025       [place] February 2, 2025       Monday February 3, 2025            Ham Juan MD

## 2024-12-11 ENCOUNTER — TELEPHONE (OUTPATIENT)
Dept: FAMILY MEDICINE CLINIC | Facility: CLINIC | Age: 49
End: 2024-12-11
Payer: COMMERCIAL

## 2024-12-11 ENCOUNTER — PATIENT MESSAGE (OUTPATIENT)
Dept: FAMILY MEDICINE CLINIC | Facility: CLINIC | Age: 49
End: 2024-12-11
Payer: COMMERCIAL

## 2024-12-11 NOTE — TELEPHONE ENCOUNTER
Please let know I got her bp readings-- one day was quite elevated, others pretty well controlled-  if she doesn't mind, please have her continue to keep bp log over next 2 weeks and send in so can continue to monitor  -prior if having readings consistently > 130/80; thanks

## 2025-01-02 RX ORDER — LOSARTAN POTASSIUM 100 MG/1
100 TABLET ORAL DAILY
Qty: 90 TABLET | Refills: 0 | Status: SHIPPED | OUTPATIENT
Start: 2025-01-02

## 2025-01-02 NOTE — TELEPHONE ENCOUNTER
Please let know I sent in the 100 mg dosage of losartan- continue to monitor blood pressure to make sure not dropping too low on new dosage; if for some reason it does, then to go back on the 50 mg dosage--- encourage healthy diet and exercise and please send me readings in next 1-2 weeks, prior as needed

## 2025-04-07 RX ORDER — LOSARTAN POTASSIUM 100 MG/1
100 TABLET ORAL DAILY
Qty: 30 TABLET | Refills: 0 | Status: SHIPPED | OUTPATIENT
Start: 2025-04-07

## 2025-05-29 ENCOUNTER — RESULTS FOLLOW-UP (OUTPATIENT)
Dept: FAMILY MEDICINE CLINIC | Facility: CLINIC | Age: 50
End: 2025-05-29
Payer: COMMERCIAL

## 2025-07-01 ENCOUNTER — TELEPHONE (OUTPATIENT)
Dept: FAMILY MEDICINE CLINIC | Facility: CLINIC | Age: 50
End: 2025-07-01
Payer: COMMERCIAL

## 2025-07-01 RX ORDER — LOSARTAN POTASSIUM 100 MG/1
100 TABLET ORAL DAILY
Qty: 30 TABLET | Refills: 0 | Status: SHIPPED | OUTPATIENT
Start: 2025-07-01

## 2025-07-01 NOTE — TELEPHONE ENCOUNTER
Incoming Refill Request      Medication requested (name and dose): losartan 100 mg     Pharmacy where request should be sent: [facility] in [place]    Additional details provided by patient: Patient is leaving for vacation this weekend and needs a refill to get her until she can get back and schedule an appt.    Best call back number: [phone]    Does the patient have less than a 3 day supply:  [x] Yes  [] No    Gagandeep Moncada Rep  07/01/25, 13:15 EDT

## 2025-07-15 ENCOUNTER — OFFICE VISIT (OUTPATIENT)
Dept: FAMILY MEDICINE CLINIC | Facility: CLINIC | Age: 50
End: 2025-07-15
Payer: COMMERCIAL

## 2025-07-15 VITALS
WEIGHT: 167 LBS | OXYGEN SATURATION: 96 % | HEART RATE: 91 BPM | SYSTOLIC BLOOD PRESSURE: 134 MMHG | HEIGHT: 62 IN | DIASTOLIC BLOOD PRESSURE: 80 MMHG | BODY MASS INDEX: 30.73 KG/M2

## 2025-07-15 DIAGNOSIS — F41.1 GENERALIZED ANXIETY DISORDER: ICD-10-CM

## 2025-07-15 DIAGNOSIS — I10 HYPERTENSION, ESSENTIAL: Primary | ICD-10-CM

## 2025-07-15 DIAGNOSIS — M79.7 FIBROMYALGIA: ICD-10-CM

## 2025-07-15 PROCEDURE — 99214 OFFICE O/P EST MOD 30 MIN: CPT | Performed by: PHYSICIAN ASSISTANT

## 2025-07-15 RX ORDER — DULOXETIN HYDROCHLORIDE 60 MG/1
60 CAPSULE, DELAYED RELEASE ORAL DAILY
Qty: 90 CAPSULE | Refills: 1 | Status: SHIPPED | OUTPATIENT
Start: 2025-07-15

## 2025-07-15 RX ORDER — LOSARTAN POTASSIUM 100 MG/1
100 TABLET ORAL DAILY
Qty: 90 TABLET | Refills: 1 | Status: SHIPPED | OUTPATIENT
Start: 2025-07-15

## 2025-07-15 NOTE — PROGRESS NOTES
"Chief Complaint  Hypertension (Med check )    Subjective          Rebeca Fitzpatrick presents to [facility]  History of Present Illness  Patient in today for medication recheck and refills.  She states her blood pressure has been doing well.  Denies any chest pain or shortness of breath.  She states the duloxetine continues to work well for her general anxiety as well as her fibromyalgia symptoms.  Denies side effects of medication.  She would like to return to clinic for fasting labs as just got back from vacation and would like to work on healthier diet for a few weeks prior to having those drawn.  She is due for her mammogram and she states will call and schedule her own appointment.  She is currently up-to-date on her GYN exam and her colonoscopy.  She denies any changes to bowel or urine habits.  Hypertension  Chronicity:  Chronic  Onset:  More than 1 year ago  Progression since onset:  Improved  Condition status:  Controlled  Associated symptoms: anxiety    Associated symptoms: no blurred vision, no chest pain, no headaches, no malaise/fatigue, no orthopnea, no palpitations, no peripheral edema, no shortness of breath and no dizziness    Agents associated with hypertension:  No associated agents  Compliance problems:  Psychosocial issues  Anxiety  Visit:  Follow-up    Symptoms: nervous/anxious      Symptoms: no chest pain, no depressed mood, no dizziness, no malaise/fatigue, no nausea, no palpitations, no shortness of breath and no suicidal ideas        Objective   Vital Signs:   /80   Pulse 91   Ht 157.5 cm (62\")   Wt 75.8 kg (167 lb)   SpO2 96%   BMI 30.54 kg/m²     Body mass index is 30.54 kg/m².    Review of Systems   Constitutional:  Negative for fatigue, fever and malaise/fatigue.   HENT:  Negative for congestion and sore throat.    Eyes:  Negative for blurred vision.   Respiratory:  Negative for cough and shortness of breath.    Cardiovascular:  Negative for " chest pain, palpitations and orthopnea.   Gastrointestinal:  Negative for abdominal pain, diarrhea, nausea and vomiting.   Genitourinary:  Negative for dysuria.   Neurological:  Negative for dizziness and headache.   Psychiatric/Behavioral:  Negative for suicidal ideas and depressed mood. The patient is nervous/anxious.        Past History:  Medical History: has a past medical history of Allergic, Anxiety, Arthritis, Chronic major depressive disorder, single episode (10/22/2015), Extremity pain, Fibromyalgia, primary, GERD (gastroesophageal reflux disease), Headache, Hypertension, Low back pain, Migraine headache, and Neck pain.   Surgical History: has a past surgical history that includes Laser laparoscopy (2002) and Eye surgery.   Family History: family history includes Alcohol abuse in her father; Alzheimer's disease in an other family member; Brain cancer in her father; Depression in her maternal grandmother; Heart attack in her maternal grandmother and mother; Hyperlipidemia in her mother; Hypertension in her mother; Migraines in her sister; Obesity in her sister.   Social History: reports that she has never smoked. She has never used smokeless tobacco. She reports that she does not drink alcohol and does not use drugs.      Current Outpatient Medications:     DULoxetine (CYMBALTA) 60 MG capsule, Take 1 capsule by mouth Daily., Disp: 90 capsule, Rfl: 1    ibuprofen (ADVIL,MOTRIN) 600 MG tablet, Take 1 tablet by mouth Every 6 (Six) Hours As Needed for Mild Pain., Disp: 30 tablet, Rfl: 2    Loratadine 10 MG capsule, Take  by mouth., Disp: , Rfl:     losartan (COZAAR) 100 MG tablet, Take 1 tablet by mouth Daily., Disp: 90 tablet, Rfl: 1    Nurtec 75 MG dispersible tablet, TAKE 1 TABLET BY MOUTH AS NEEDED FOR MIGRAINE, Disp: 9 tablet, Rfl: 0    ondansetron (Zofran) 4 MG tablet, Take 1 tablet by mouth Every 8 (Eight) Hours As Needed for Nausea or Vomiting., Disp: 20 tablet, Rfl: 1  Allergies: Patient has no known  allergies.    Physical Exam  Constitutional:       Appearance: Normal appearance.   HENT:      Right Ear: Tympanic membrane normal.      Left Ear: Tympanic membrane normal.      Mouth/Throat:      Pharynx: Oropharynx is clear.   Eyes:      Conjunctiva/sclera: Conjunctivae normal.      Pupils: Pupils are equal, round, and reactive to light.   Cardiovascular:      Rate and Rhythm: Normal rate and regular rhythm.      Heart sounds: Normal heart sounds.   Pulmonary:      Effort: Pulmonary effort is normal.      Breath sounds: Normal breath sounds.   Abdominal:      Palpations: Abdomen is soft.      Tenderness: There is no abdominal tenderness.   Neurological:      Mental Status: She is oriented to person, place, and time.   Psychiatric:         Mood and Affect: Mood normal.         Behavior: Behavior normal.             Assessment and Plan   Diagnoses and all orders for this visit:    1. Hypertension, essential (Primary)  Continue current medication; will rtc for fasting labs ; encouraged healthy diet and exercise ; she states will call to schedule own mammogram  2. Generalized anxiety disorder  Refilled duloxetine- denies side effects of medication; rtc prior to recheck as needed   3. Fibromyalgia    Other orders  -     DULoxetine (CYMBALTA) 60 MG capsule; Take 1 capsule by mouth Daily.  Dispense: 90 capsule; Refill: 1  -     losartan (COZAAR) 100 MG tablet; Take 1 tablet by mouth Daily.  Dispense: 90 tablet; Refill: 1            Follow Up   No follow-ups on file.  Patient was given instructions and counseling regarding her condition or for health maintenance advice. Please see specific information pulled into the AVS if appropriate.     Diamante Julian PA-C